# Patient Record
Sex: MALE | Race: WHITE | NOT HISPANIC OR LATINO | Employment: FULL TIME | URBAN - METROPOLITAN AREA
[De-identification: names, ages, dates, MRNs, and addresses within clinical notes are randomized per-mention and may not be internally consistent; named-entity substitution may affect disease eponyms.]

---

## 2018-05-04 ENCOUNTER — TRANSFERRED RECORDS (OUTPATIENT)
Dept: HEALTH INFORMATION MANAGEMENT | Facility: CLINIC | Age: 43
End: 2018-05-04

## 2018-05-14 ENCOUNTER — HOSPITAL ENCOUNTER (OUTPATIENT)
Dept: PHYSICAL THERAPY | Facility: CLINIC | Age: 43
Setting detail: THERAPIES SERIES
End: 2018-05-14
Attending: NURSE PRACTITIONER

## 2018-05-14 PROCEDURE — 97162 PT EVAL MOD COMPLEX 30 MIN: CPT | Mod: GP

## 2018-05-14 PROCEDURE — 40000718 ZZHC STATISTIC PT DEPARTMENT ORTHO VISIT

## 2018-05-14 PROCEDURE — 97110 THERAPEUTIC EXERCISES: CPT | Mod: GP
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
## 2018-05-23 ENCOUNTER — HOSPITAL ENCOUNTER (OUTPATIENT)
Dept: PHYSICAL THERAPY | Facility: CLINIC | Age: 43
Setting detail: THERAPIES SERIES
End: 2018-05-23
Attending: NURSE PRACTITIONER

## 2018-05-23 PROCEDURE — 97530 THERAPEUTIC ACTIVITIES: CPT | Mod: GP | Performed by: PHYSICAL THERAPIST

## 2018-05-23 PROCEDURE — 40000718 ZZHC STATISTIC PT DEPARTMENT ORTHO VISIT: Performed by: PHYSICAL THERAPIST

## 2018-05-29 ENCOUNTER — HOSPITAL ENCOUNTER (OUTPATIENT)
Dept: PHYSICAL THERAPY | Facility: CLINIC | Age: 43
Setting detail: THERAPIES SERIES
End: 2018-05-29
Attending: NURSE PRACTITIONER

## 2018-05-29 PROCEDURE — 97140 MANUAL THERAPY 1/> REGIONS: CPT | Mod: GP | Performed by: PHYSICAL THERAPIST

## 2018-05-29 PROCEDURE — 40000718 ZZHC STATISTIC PT DEPARTMENT ORTHO VISIT: Performed by: PHYSICAL THERAPIST

## 2018-06-05 ENCOUNTER — HOSPITAL ENCOUNTER (OUTPATIENT)
Dept: PHYSICAL THERAPY | Facility: CLINIC | Age: 43
Setting detail: THERAPIES SERIES
End: 2018-06-05
Attending: NURSE PRACTITIONER

## 2018-06-05 PROCEDURE — 97140 MANUAL THERAPY 1/> REGIONS: CPT | Mod: GP | Performed by: PHYSICAL THERAPIST

## 2018-06-05 PROCEDURE — 40000718 ZZHC STATISTIC PT DEPARTMENT ORTHO VISIT: Performed by: PHYSICAL THERAPIST

## 2018-06-08 ENCOUNTER — HOSPITAL ENCOUNTER (OUTPATIENT)
Dept: PHYSICAL THERAPY | Facility: CLINIC | Age: 43
Setting detail: THERAPIES SERIES
End: 2018-06-08
Attending: NURSE PRACTITIONER

## 2018-06-08 PROCEDURE — 40000718 ZZHC STATISTIC PT DEPARTMENT ORTHO VISIT: Performed by: PHYSICAL THERAPIST

## 2018-06-08 PROCEDURE — 97110 THERAPEUTIC EXERCISES: CPT | Mod: GP | Performed by: PHYSICAL THERAPIST

## 2018-06-08 PROCEDURE — 97140 MANUAL THERAPY 1/> REGIONS: CPT | Mod: GP | Performed by: PHYSICAL THERAPIST

## 2018-06-13 ENCOUNTER — HOSPITAL ENCOUNTER (OUTPATIENT)
Dept: PHYSICAL THERAPY | Facility: CLINIC | Age: 43
Setting detail: THERAPIES SERIES
End: 2018-06-13
Attending: NURSE PRACTITIONER

## 2018-06-13 PROCEDURE — 40000718 ZZHC STATISTIC PT DEPARTMENT ORTHO VISIT: Performed by: PHYSICAL THERAPIST

## 2018-06-13 PROCEDURE — 97140 MANUAL THERAPY 1/> REGIONS: CPT | Mod: GP | Performed by: PHYSICAL THERAPIST

## 2018-06-13 PROCEDURE — 97110 THERAPEUTIC EXERCISES: CPT | Mod: GP | Performed by: PHYSICAL THERAPIST

## 2018-06-20 ENCOUNTER — HOSPITAL ENCOUNTER (OUTPATIENT)
Dept: PHYSICAL THERAPY | Facility: CLINIC | Age: 43
Setting detail: THERAPIES SERIES
End: 2018-06-20
Attending: NURSE PRACTITIONER

## 2018-06-20 PROCEDURE — 97530 THERAPEUTIC ACTIVITIES: CPT | Mod: GP | Performed by: PHYSICAL THERAPIST

## 2018-06-20 PROCEDURE — 97140 MANUAL THERAPY 1/> REGIONS: CPT | Mod: GP | Performed by: PHYSICAL THERAPIST

## 2018-06-20 PROCEDURE — 40000718 ZZHC STATISTIC PT DEPARTMENT ORTHO VISIT: Performed by: PHYSICAL THERAPIST

## 2018-06-29 ENCOUNTER — HOSPITAL ENCOUNTER (OUTPATIENT)
Dept: PHYSICAL THERAPY | Facility: CLINIC | Age: 43
Setting detail: THERAPIES SERIES
End: 2018-06-29
Attending: NURSE PRACTITIONER

## 2018-06-29 PROCEDURE — 97140 MANUAL THERAPY 1/> REGIONS: CPT | Mod: GP | Performed by: PHYSICAL THERAPIST

## 2018-06-29 PROCEDURE — 40000718 ZZHC STATISTIC PT DEPARTMENT ORTHO VISIT: Performed by: PHYSICAL THERAPIST

## 2018-07-02 ENCOUNTER — HOSPITAL ENCOUNTER (OUTPATIENT)
Dept: PHYSICAL THERAPY | Facility: CLINIC | Age: 43
Setting detail: THERAPIES SERIES
End: 2018-07-02
Attending: NURSE PRACTITIONER

## 2018-07-02 PROCEDURE — 97140 MANUAL THERAPY 1/> REGIONS: CPT | Mod: GP | Performed by: PHYSICAL THERAPIST

## 2018-07-02 PROCEDURE — 97035 APP MDLTY 1+ULTRASOUND EA 15: CPT | Mod: GP | Performed by: PHYSICAL THERAPIST

## 2018-07-02 PROCEDURE — 40000718 ZZHC STATISTIC PT DEPARTMENT ORTHO VISIT: Performed by: PHYSICAL THERAPIST

## 2018-07-09 ENCOUNTER — HOSPITAL ENCOUNTER (OUTPATIENT)
Dept: PHYSICAL THERAPY | Facility: CLINIC | Age: 43
Setting detail: THERAPIES SERIES
End: 2018-07-09
Attending: NURSE PRACTITIONER

## 2018-07-09 PROCEDURE — 40000718 ZZHC STATISTIC PT DEPARTMENT ORTHO VISIT: Performed by: PHYSICAL THERAPIST

## 2018-07-09 PROCEDURE — 97035 APP MDLTY 1+ULTRASOUND EA 15: CPT | Mod: GP | Performed by: PHYSICAL THERAPIST

## 2018-07-09 PROCEDURE — 97140 MANUAL THERAPY 1/> REGIONS: CPT | Mod: GP | Performed by: PHYSICAL THERAPIST

## 2018-07-16 ENCOUNTER — HOSPITAL ENCOUNTER (OUTPATIENT)
Dept: PHYSICAL THERAPY | Facility: CLINIC | Age: 43
Setting detail: THERAPIES SERIES
End: 2018-07-16
Attending: NURSE PRACTITIONER

## 2018-07-16 PROCEDURE — 97035 APP MDLTY 1+ULTRASOUND EA 15: CPT | Mod: GP | Performed by: PHYSICAL THERAPIST

## 2018-07-16 PROCEDURE — 97140 MANUAL THERAPY 1/> REGIONS: CPT | Mod: GP | Performed by: PHYSICAL THERAPIST

## 2018-07-16 PROCEDURE — 40000718 ZZHC STATISTIC PT DEPARTMENT ORTHO VISIT: Performed by: PHYSICAL THERAPIST

## 2018-07-24 ENCOUNTER — HOSPITAL ENCOUNTER (OUTPATIENT)
Dept: PHYSICAL THERAPY | Facility: CLINIC | Age: 43
Setting detail: THERAPIES SERIES
End: 2018-07-24
Attending: NURSE PRACTITIONER

## 2018-07-24 PROCEDURE — 97140 MANUAL THERAPY 1/> REGIONS: CPT | Mod: GP | Performed by: PHYSICAL THERAPIST

## 2018-07-24 PROCEDURE — 40000718 ZZHC STATISTIC PT DEPARTMENT ORTHO VISIT: Performed by: PHYSICAL THERAPIST

## 2018-07-24 PROCEDURE — 97110 THERAPEUTIC EXERCISES: CPT | Mod: GP | Performed by: PHYSICAL THERAPIST

## 2018-07-24 NOTE — PROGRESS NOTES
Outpatient Physical Therapy Progress Note     Patient: Jaquan Schwab  : 1975    Beginning/End Dates of Reporting Period:  2018 to 2018    Referring Provider: Bharati Lay CNP    Therapy Diagnosis: Low back pain radiating into L LE     Client Self Report: Pt states having good days and bad days.  Pt reports he is noticing on the bad days still having about 8-9/10 pain.      Objective Measurements:  Objective Measure: Palpation  Details: Improved L QL mobility.  Continues to have L gluteal tenderness and tightness.  Objective Measure: Observation  Details: Standing and sitting more upright.  However continues to have a slight forward lean and unable to stand at neutral yet.    Outcome Measures (most recent score):  Nikolay STarT Sub-Score (Q5-9): 2  Nikolay STarT Total Score (all 9): 5  Oswestry Score (%): 60 %    Goals:  Goal Identifier Pain   Goal Description Pt will report <2/10 pain after working all day in order to demonstrate decresaed pain and limitations with mobility.   Target Date 18   Date Met   (End of the day 8-9/10, 1 hour to calm down)   Progress:     Goal Identifier ROM   Goal Description Pt will demonstrate full lumbar and L LE ROM in order to decrease stiffness and neural tension in LE's.    Target Date 18   Date Met   (Not having full extension)   Progress:     Goal Identifier Strength   Goal Description Pt will demonstrate 5/5 B hip strength in order to demonstrate improved gait and stability.    Target Date 18   Date Met   (Still feeling weakness at the end of the day, gives out)   Progress:     Goal Identifier Ambulation   Goal Description Pt will be able to ambulate for 5minutes continuously with appropriate posture and speed to demonstrate decreased pain and limitation.   Target Date 18   Date Met   (Still leaning forward, slower pace, 2-3 minutes only)   Progress:     Progress Toward Goals:   Progress this reporting period: Pt has been working on HEP and  symptom management.  Pt continues to demonstrate poor upright and sitting posture due to pain and muscle/joint restrictions.  Pt is walking slower, sitting and standing for short periods of time, and having difficulties lifting.  He has noticed improvement with sleeping and more good days of less pain.  Pt states HEP does not bother him, however towards the end of a work day he is more bent forward and in more pain.  It is taking less time for him to calm symptoms down and improve posture.  Pt will benefit from continued PT services to improve overall strength, mobility, posture, and positioning in order to improve functional activities.    Plan:  Continue therapy per current plan of care.    Discharge:  No    Thank you for your referral.    Lucia Best, PT, DPT  [facility]  [phone]

## 2018-07-30 ENCOUNTER — HOSPITAL ENCOUNTER (OUTPATIENT)
Dept: PHYSICAL THERAPY | Facility: CLINIC | Age: 43
Setting detail: THERAPIES SERIES
End: 2018-07-30
Attending: NURSE PRACTITIONER

## 2018-07-30 PROCEDURE — 97140 MANUAL THERAPY 1/> REGIONS: CPT | Mod: GP | Performed by: PHYSICAL THERAPIST

## 2018-07-30 PROCEDURE — 97012 MECHANICAL TRACTION THERAPY: CPT | Mod: GP | Performed by: PHYSICAL THERAPIST

## 2018-07-30 PROCEDURE — 97530 THERAPEUTIC ACTIVITIES: CPT | Mod: GP | Performed by: PHYSICAL THERAPIST

## 2018-07-30 PROCEDURE — 40000718 ZZHC STATISTIC PT DEPARTMENT ORTHO VISIT: Performed by: PHYSICAL THERAPIST

## 2018-08-07 ENCOUNTER — HOSPITAL ENCOUNTER (OUTPATIENT)
Dept: PHYSICAL THERAPY | Facility: CLINIC | Age: 43
Setting detail: THERAPIES SERIES
End: 2018-08-07
Attending: NURSE PRACTITIONER

## 2018-08-07 PROCEDURE — 97110 THERAPEUTIC EXERCISES: CPT | Mod: GP | Performed by: PHYSICAL THERAPIST

## 2018-08-07 PROCEDURE — 40000718 ZZHC STATISTIC PT DEPARTMENT ORTHO VISIT: Performed by: PHYSICAL THERAPIST

## 2018-08-07 PROCEDURE — 97012 MECHANICAL TRACTION THERAPY: CPT | Mod: GP | Performed by: PHYSICAL THERAPIST

## 2018-08-13 ENCOUNTER — HOSPITAL ENCOUNTER (OUTPATIENT)
Dept: PHYSICAL THERAPY | Facility: CLINIC | Age: 43
Setting detail: THERAPIES SERIES
End: 2018-08-13
Attending: NURSE PRACTITIONER

## 2018-08-13 PROCEDURE — 97110 THERAPEUTIC EXERCISES: CPT | Mod: GP | Performed by: PHYSICAL THERAPIST

## 2018-08-13 PROCEDURE — 40000718 ZZHC STATISTIC PT DEPARTMENT ORTHO VISIT: Performed by: PHYSICAL THERAPIST

## 2018-08-13 PROCEDURE — 97012 MECHANICAL TRACTION THERAPY: CPT | Mod: GP | Performed by: PHYSICAL THERAPIST

## 2018-08-21 ENCOUNTER — HOSPITAL ENCOUNTER (OUTPATIENT)
Dept: PHYSICAL THERAPY | Facility: CLINIC | Age: 43
Setting detail: THERAPIES SERIES
End: 2018-08-21
Attending: NURSE PRACTITIONER

## 2018-08-21 PROCEDURE — 97110 THERAPEUTIC EXERCISES: CPT | Mod: GP

## 2018-08-21 PROCEDURE — 97140 MANUAL THERAPY 1/> REGIONS: CPT | Mod: GP

## 2018-08-21 PROCEDURE — 40000718 ZZHC STATISTIC PT DEPARTMENT ORTHO VISIT

## 2018-08-28 ENCOUNTER — HOSPITAL ENCOUNTER (OUTPATIENT)
Dept: PHYSICAL THERAPY | Facility: CLINIC | Age: 43
Setting detail: THERAPIES SERIES
End: 2018-08-28
Attending: NURSE PRACTITIONER

## 2018-08-28 PROCEDURE — 97012 MECHANICAL TRACTION THERAPY: CPT | Mod: GP | Performed by: PHYSICAL THERAPIST

## 2018-08-28 PROCEDURE — 40000718 ZZHC STATISTIC PT DEPARTMENT ORTHO VISIT: Performed by: PHYSICAL THERAPIST

## 2018-08-28 PROCEDURE — 97530 THERAPEUTIC ACTIVITIES: CPT | Mod: GP | Performed by: PHYSICAL THERAPIST

## 2018-09-04 ENCOUNTER — HOSPITAL ENCOUNTER (OUTPATIENT)
Dept: PHYSICAL THERAPY | Facility: CLINIC | Age: 43
Setting detail: THERAPIES SERIES
End: 2018-09-04
Attending: NURSE PRACTITIONER

## 2018-09-04 PROCEDURE — 97012 MECHANICAL TRACTION THERAPY: CPT | Mod: GP | Performed by: PHYSICAL THERAPIST

## 2018-09-04 PROCEDURE — 40000718 ZZHC STATISTIC PT DEPARTMENT ORTHO VISIT: Performed by: PHYSICAL THERAPIST

## 2018-09-04 PROCEDURE — 97110 THERAPEUTIC EXERCISES: CPT | Mod: GP | Performed by: PHYSICAL THERAPIST

## 2018-09-11 ENCOUNTER — HOSPITAL ENCOUNTER (OUTPATIENT)
Dept: PHYSICAL THERAPY | Facility: CLINIC | Age: 43
Setting detail: THERAPIES SERIES
End: 2018-09-11
Attending: NURSE PRACTITIONER

## 2018-09-11 PROCEDURE — 97140 MANUAL THERAPY 1/> REGIONS: CPT | Mod: GP | Performed by: PHYSICAL THERAPIST

## 2018-09-11 PROCEDURE — 40000718 ZZHC STATISTIC PT DEPARTMENT ORTHO VISIT: Performed by: PHYSICAL THERAPIST

## 2018-09-21 ENCOUNTER — HOSPITAL ENCOUNTER (OUTPATIENT)
Dept: PHYSICAL THERAPY | Facility: CLINIC | Age: 43
Setting detail: THERAPIES SERIES
End: 2018-09-21
Attending: NURSE PRACTITIONER

## 2018-09-21 PROCEDURE — 97530 THERAPEUTIC ACTIVITIES: CPT | Mod: GP | Performed by: PHYSICAL THERAPIST

## 2018-09-21 PROCEDURE — 40000718 ZZHC STATISTIC PT DEPARTMENT ORTHO VISIT: Performed by: PHYSICAL THERAPIST

## 2018-09-21 PROCEDURE — 97012 MECHANICAL TRACTION THERAPY: CPT | Mod: GP | Performed by: PHYSICAL THERAPIST

## 2018-09-21 NOTE — PROGRESS NOTES
Outpatient Physical Therapy Progress Note     Patient: Jaquan Schwab  : 1975    Beginning/End Dates of Reporting Period:  2018 to 2018    Referring Provider: Bharati Lay CNP    Therapy Diagnosis: Low back pain radiating into L LE     Client Self Report: On  to Wednesday he had to fly out of state and was at a convention for 3 days.  He has a flare up of symptoms but not as bad as last convention he was at.  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  Saturday having a flare up of symptoms and now about 6-7/10 after work and about 1.5-2 hours to calm down.    Objective Measurements:  Objective Measure: Nikolay  Details:  Medium  Objective Measure: CATHERINE  Details: 60%  Objective Measure: Palpation  Details: L QL tightness.  Decreased L4-5 extension and sacral flexion.  Objective Measure: Observation  Details: Slight forward bent position since last visit.  More symptoms and flared up.     Goals:  Goal Identifier Pain   Goal Description Pt will report <2/10 pain after working all day in order to demonstrate decresaed pain and limitations with mobility.   Target Date 18   Date Met   (End of the day 6-7/10, 1.5-2 hour to calm down)   Progress:     Goal Identifier ROM   Goal Description Pt will demonstrate full lumbar and L LE ROM in order to decrease stiffness and neural tension in LE's.    Target Date 18   Date Met   (Not having full extension, still bent forward)   Progress:     Goal Identifier Strength   Goal Description Pt will demonstrate 5/5 B hip strength in order to demonstrate improved gait and stability.    Target Date 18   Date Met   (Coming back, not giving out)   Progress:     Goal Identifier Ambulation   Goal Description Pt will be able to ambulate for 5minutes continuously with appropriate posture and speed to demonstrate decreased pain and limitation.   Target Date 18   Date Met  18 (Less  then 5 minutes this past week)   Progress:     Progress Toward Goals:   Progress this reporting period: Pt has been progressing really well the past few weeks, however over this past weekend he had to travel out of state on a plane and did a lot of walking for a convention for work.  When pt is required to do a lot of standing and walking he gets increased symptoms.  The last time he had this flare up was after the [place] convention where he was standing and walking for 3 days.  Pt does not have the strength, endurance, stability, and mobility in the lumbosacral spine yet to tolerate this higher level of activity.  Pt will benefit from skilled PT services to address impairments and work towards therapy goals.    Plan:  Continue therapy per current plan of care.    Discharge:  No    Thank you for your referral.    Lucia Long, PT, DPT  [facility]  [phone]

## 2018-09-25 ENCOUNTER — HOSPITAL ENCOUNTER (OUTPATIENT)
Dept: PHYSICAL THERAPY | Facility: CLINIC | Age: 43
Setting detail: THERAPIES SERIES
End: 2018-09-25
Attending: NURSE PRACTITIONER

## 2018-09-25 PROCEDURE — 97012 MECHANICAL TRACTION THERAPY: CPT | Mod: GP | Performed by: PHYSICAL THERAPIST

## 2018-09-25 PROCEDURE — 97140 MANUAL THERAPY 1/> REGIONS: CPT | Mod: GP | Performed by: PHYSICAL THERAPIST

## 2018-09-25 PROCEDURE — 40000718 ZZHC STATISTIC PT DEPARTMENT ORTHO VISIT: Performed by: PHYSICAL THERAPIST

## 2018-10-02 ENCOUNTER — HOSPITAL ENCOUNTER (OUTPATIENT)
Dept: PHYSICAL THERAPY | Facility: CLINIC | Age: 43
Setting detail: THERAPIES SERIES
End: 2018-10-02
Attending: NURSE PRACTITIONER

## 2018-10-02 PROCEDURE — 97140 MANUAL THERAPY 1/> REGIONS: CPT | Mod: GP | Performed by: PHYSICAL THERAPIST

## 2018-10-02 PROCEDURE — 97012 MECHANICAL TRACTION THERAPY: CPT | Mod: GP | Performed by: PHYSICAL THERAPIST

## 2018-10-02 PROCEDURE — 40000718 ZZHC STATISTIC PT DEPARTMENT ORTHO VISIT: Performed by: PHYSICAL THERAPIST

## 2018-10-08 ENCOUNTER — HOSPITAL ENCOUNTER (OUTPATIENT)
Dept: PHYSICAL THERAPY | Facility: CLINIC | Age: 43
Setting detail: THERAPIES SERIES
End: 2018-10-08
Attending: NURSE PRACTITIONER

## 2018-10-08 PROCEDURE — 40000718 ZZHC STATISTIC PT DEPARTMENT ORTHO VISIT: Performed by: PHYSICAL THERAPIST

## 2018-10-08 PROCEDURE — 97140 MANUAL THERAPY 1/> REGIONS: CPT | Mod: GP | Performed by: PHYSICAL THERAPIST

## 2018-10-12 ENCOUNTER — HOSPITAL ENCOUNTER (OUTPATIENT)
Dept: PHYSICAL THERAPY | Facility: CLINIC | Age: 43
Setting detail: THERAPIES SERIES
End: 2018-10-12
Attending: NURSE PRACTITIONER

## 2018-10-12 PROCEDURE — 97530 THERAPEUTIC ACTIVITIES: CPT | Mod: GP | Performed by: PHYSICAL THERAPIST

## 2018-10-12 PROCEDURE — 97012 MECHANICAL TRACTION THERAPY: CPT | Mod: GP | Performed by: PHYSICAL THERAPIST

## 2018-10-12 PROCEDURE — 40000718 ZZHC STATISTIC PT DEPARTMENT ORTHO VISIT: Performed by: PHYSICAL THERAPIST

## 2018-10-16 ENCOUNTER — HOSPITAL ENCOUNTER (OUTPATIENT)
Dept: PHYSICAL THERAPY | Facility: CLINIC | Age: 43
Setting detail: THERAPIES SERIES
End: 2018-10-16
Attending: NURSE PRACTITIONER

## 2018-10-16 PROCEDURE — 97140 MANUAL THERAPY 1/> REGIONS: CPT | Mod: GP | Performed by: PHYSICAL THERAPIST

## 2018-10-16 PROCEDURE — 40000718 ZZHC STATISTIC PT DEPARTMENT ORTHO VISIT: Performed by: PHYSICAL THERAPIST

## 2018-10-23 ENCOUNTER — HOSPITAL ENCOUNTER (OUTPATIENT)
Dept: PHYSICAL THERAPY | Facility: CLINIC | Age: 43
Setting detail: THERAPIES SERIES
End: 2018-10-23
Attending: NURSE PRACTITIONER

## 2018-10-23 PROCEDURE — 40000718 ZZHC STATISTIC PT DEPARTMENT ORTHO VISIT: Performed by: PHYSICAL THERAPIST

## 2018-10-23 PROCEDURE — 97140 MANUAL THERAPY 1/> REGIONS: CPT | Mod: GP | Performed by: PHYSICAL THERAPIST

## 2018-10-30 ENCOUNTER — HOSPITAL ENCOUNTER (OUTPATIENT)
Dept: PHYSICAL THERAPY | Facility: CLINIC | Age: 43
Setting detail: THERAPIES SERIES
End: 2018-10-30
Attending: NURSE PRACTITIONER

## 2018-10-30 PROCEDURE — 97140 MANUAL THERAPY 1/> REGIONS: CPT | Mod: GP | Performed by: PHYSICAL THERAPIST

## 2018-10-30 PROCEDURE — 40000718 ZZHC STATISTIC PT DEPARTMENT ORTHO VISIT: Performed by: PHYSICAL THERAPIST

## 2018-11-06 ENCOUNTER — HOSPITAL ENCOUNTER (OUTPATIENT)
Dept: PHYSICAL THERAPY | Facility: CLINIC | Age: 43
Setting detail: THERAPIES SERIES
End: 2018-11-06
Attending: NURSE PRACTITIONER

## 2018-11-06 PROCEDURE — 97140 MANUAL THERAPY 1/> REGIONS: CPT | Mod: GP | Performed by: PHYSICAL THERAPIST

## 2018-11-06 PROCEDURE — 40000718 ZZHC STATISTIC PT DEPARTMENT ORTHO VISIT: Performed by: PHYSICAL THERAPIST

## 2018-11-06 PROCEDURE — 97035 APP MDLTY 1+ULTRASOUND EA 15: CPT | Mod: GP | Performed by: PHYSICAL THERAPIST

## 2018-11-13 ENCOUNTER — HOSPITAL ENCOUNTER (OUTPATIENT)
Dept: PHYSICAL THERAPY | Facility: CLINIC | Age: 43
Setting detail: THERAPIES SERIES
End: 2018-11-13
Attending: NURSE PRACTITIONER

## 2018-11-13 PROCEDURE — 40000718 ZZHC STATISTIC PT DEPARTMENT ORTHO VISIT: Performed by: PHYSICAL THERAPIST

## 2018-11-13 PROCEDURE — 97140 MANUAL THERAPY 1/> REGIONS: CPT | Mod: GP | Performed by: PHYSICAL THERAPIST

## 2018-11-13 PROCEDURE — 97035 APP MDLTY 1+ULTRASOUND EA 15: CPT | Mod: GP | Performed by: PHYSICAL THERAPIST

## 2018-11-19 ENCOUNTER — HOSPITAL ENCOUNTER (OUTPATIENT)
Dept: PHYSICAL THERAPY | Facility: CLINIC | Age: 43
Setting detail: THERAPIES SERIES
End: 2018-11-19
Attending: NURSE PRACTITIONER

## 2018-11-19 PROCEDURE — 97140 MANUAL THERAPY 1/> REGIONS: CPT | Mod: GP | Performed by: PHYSICAL THERAPIST

## 2018-11-19 PROCEDURE — 97035 APP MDLTY 1+ULTRASOUND EA 15: CPT | Mod: GP | Performed by: PHYSICAL THERAPIST

## 2018-11-19 PROCEDURE — 40000718 ZZHC STATISTIC PT DEPARTMENT ORTHO VISIT: Performed by: PHYSICAL THERAPIST

## 2018-11-26 ENCOUNTER — HOSPITAL ENCOUNTER (OUTPATIENT)
Dept: PHYSICAL THERAPY | Facility: CLINIC | Age: 43
Setting detail: THERAPIES SERIES
End: 2018-11-26
Attending: NURSE PRACTITIONER

## 2018-11-26 PROCEDURE — 40000718 ZZHC STATISTIC PT DEPARTMENT ORTHO VISIT: Performed by: PHYSICAL THERAPIST

## 2018-11-26 PROCEDURE — 97140 MANUAL THERAPY 1/> REGIONS: CPT | Mod: GP | Performed by: PHYSICAL THERAPIST

## 2018-12-04 ENCOUNTER — HOSPITAL ENCOUNTER (OUTPATIENT)
Dept: PHYSICAL THERAPY | Facility: CLINIC | Age: 43
Setting detail: THERAPIES SERIES
End: 2018-12-04
Attending: NURSE PRACTITIONER

## 2018-12-04 PROCEDURE — 97035 APP MDLTY 1+ULTRASOUND EA 15: CPT | Mod: GP | Performed by: PHYSICAL THERAPIST

## 2018-12-04 PROCEDURE — 97140 MANUAL THERAPY 1/> REGIONS: CPT | Mod: GP | Performed by: PHYSICAL THERAPIST

## 2018-12-04 PROCEDURE — 40000718 ZZHC STATISTIC PT DEPARTMENT ORTHO VISIT: Performed by: PHYSICAL THERAPIST

## 2018-12-11 ENCOUNTER — HOSPITAL ENCOUNTER (OUTPATIENT)
Dept: PHYSICAL THERAPY | Facility: CLINIC | Age: 43
Setting detail: THERAPIES SERIES
End: 2018-12-11
Attending: NURSE PRACTITIONER

## 2018-12-11 PROCEDURE — 40000718 ZZHC STATISTIC PT DEPARTMENT ORTHO VISIT: Performed by: PHYSICAL THERAPIST

## 2018-12-11 PROCEDURE — 97140 MANUAL THERAPY 1/> REGIONS: CPT | Mod: GP | Performed by: PHYSICAL THERAPIST

## 2018-12-11 PROCEDURE — 97116 GAIT TRAINING THERAPY: CPT | Mod: GP | Performed by: PHYSICAL THERAPIST

## 2018-12-18 ENCOUNTER — HOSPITAL ENCOUNTER (OUTPATIENT)
Dept: PHYSICAL THERAPY | Facility: CLINIC | Age: 43
Setting detail: THERAPIES SERIES
End: 2018-12-18
Attending: NURSE PRACTITIONER

## 2018-12-18 PROCEDURE — 97140 MANUAL THERAPY 1/> REGIONS: CPT | Mod: GP | Performed by: PHYSICAL THERAPIST

## 2018-12-26 ENCOUNTER — HOSPITAL ENCOUNTER (OUTPATIENT)
Dept: PHYSICAL THERAPY | Facility: CLINIC | Age: 43
Setting detail: THERAPIES SERIES
End: 2018-12-26
Attending: NURSE PRACTITIONER

## 2018-12-26 PROCEDURE — 97140 MANUAL THERAPY 1/> REGIONS: CPT | Mod: GP | Performed by: PHYSICAL THERAPIST

## 2019-01-02 ENCOUNTER — HOSPITAL ENCOUNTER (OUTPATIENT)
Dept: PHYSICAL THERAPY | Facility: CLINIC | Age: 44
Setting detail: THERAPIES SERIES
End: 2019-01-02
Attending: NURSE PRACTITIONER
Payer: COMMERCIAL

## 2019-01-02 PROCEDURE — 97140 MANUAL THERAPY 1/> REGIONS: CPT | Mod: GP | Performed by: PHYSICAL THERAPIST

## 2019-01-02 NOTE — PROGRESS NOTES
Outpatient Physical Therapy Progress Note     Patient: Jaquan Schwab  : 1975    Beginning/End Dates of Reporting Period:  2018 to 2019    Referring Provider: Bharati Lay CNP    Therapy Diagnosis: Low back pain radiating into L LE     Client Self Report: Getting about 5000 steps in a day with increasing symptoms.  Can only walk about 3 minutes before he needs to brace himself and becomes slumped over.  Pt states that he has good and bad days.  Noticing towards the end of the week he is having increasing pain.  Still having intermittent L LE symptoms with walking and sitting.   Needs to sit on ice and in recliner to calm symptoms down.    Objective Measurements:  Objective Measure: Nikolay  Details: 3/7 Medium  Objective Measure: CATHERINE  Details: 56%  Objective Measure: Palpation  Details: Increased L gluteal tissue mobility, improved lumbar paraspinal mobility all on L side.  Objective Measure: Observation  Details: Walking upright for short distances.  Objective Measure: MMT  Details: 4/5 L hip abduction and flexion    Goals:  Goal Identifier Pain   Goal Description Pt will report <2/10 pain after working all day in order to demonstrate decresaed pain and limitations with mobility.   Target Date 19   Date Met  (End of the day 5-6/10, 1-1.5 hours to calm down)   Progress:     Goal Identifier ROM   Goal Description Pt will demonstrate full lumbar and L LE ROM in order to decrease stiffness and neural tension in LE's.    Target Date 19   Date Met  (Extension to neutral of lumbar spine)   Progress:     Goal Identifier Strength   Goal Description Pt will demonstrate 5/5 B hip strength in order to demonstrate improved gait and stability.    Target Date 19   Date Met  (4/5 MMT of the L LE hip flexion and abduction)   Progress:     Goal Identifier Ambulation   Goal Description Pt will be able to ambulate for 5minutes continuously with appropriate posture and speed to demonstrate decreased pain  and limitation.   Target Date 09/07/18   Date Met  09/04/18(Less then 5 minutes this past week)   Progress:     Progress Toward Goals:   Progress this reporting period: Pt has been progressing slowly towards therapy goals.  He has had 3 flare ups, one requiring repeated injections and follow up with MD.  Pt is now being referred to neurosurgeon due to symptoms and decreased effectiveness of the injections.  Pt continues to respond well to therapy and able to slowly progress his HEP.  However he continues to have end of the week increased symptoms and intermittent leg symptoms on L side.  Pt will benefit from continued PT services to address tissue and joint mobility and stability, strength, and symptom management.    Plan:  Continue therapy per current plan of care.    Discharge:  No    Thank you for your referral.    Lucia Logn, PT, DPT  [facility]  [phone]

## 2019-01-08 ENCOUNTER — HOSPITAL ENCOUNTER (OUTPATIENT)
Dept: PHYSICAL THERAPY | Facility: CLINIC | Age: 44
Setting detail: THERAPIES SERIES
End: 2019-01-08
Attending: NURSE PRACTITIONER
Payer: COMMERCIAL

## 2019-01-08 PROCEDURE — 97140 MANUAL THERAPY 1/> REGIONS: CPT | Mod: GP | Performed by: PHYSICAL THERAPIST

## 2019-01-15 ENCOUNTER — HOSPITAL ENCOUNTER (OUTPATIENT)
Dept: PHYSICAL THERAPY | Facility: CLINIC | Age: 44
Setting detail: THERAPIES SERIES
End: 2019-01-15
Attending: NURSE PRACTITIONER
Payer: COMMERCIAL

## 2019-01-15 PROCEDURE — 97140 MANUAL THERAPY 1/> REGIONS: CPT | Mod: GP | Performed by: PHYSICAL THERAPIST

## 2019-01-15 PROCEDURE — 97530 THERAPEUTIC ACTIVITIES: CPT | Mod: GP | Performed by: PHYSICAL THERAPIST

## 2019-01-22 ENCOUNTER — HOSPITAL ENCOUNTER (OUTPATIENT)
Dept: PHYSICAL THERAPY | Facility: CLINIC | Age: 44
Setting detail: THERAPIES SERIES
End: 2019-01-22
Attending: NURSE PRACTITIONER
Payer: COMMERCIAL

## 2019-01-22 PROCEDURE — 97140 MANUAL THERAPY 1/> REGIONS: CPT | Mod: GP | Performed by: PHYSICAL THERAPIST

## 2019-01-22 NOTE — PROGRESS NOTES
Outpatient Physical Therapy Discharge Note     Patient: Jaquan Schwab  : 1975    Beginning/End Dates of Reporting Period:  2019 to 2019    Referring Provider: Bharati Lay CNP    Therapy Diagnosis: Low back pain radiating into L LE     Client Self Report: Pt had to stop taking ibuprofen due to having surgery next week.  Reports the pain has been getting worse since getting off that medication.  Pt states pain today is about 7-8/10.  Reports that he is noticing more L leg pain and very constant.    Objective Measurements:  Objective Measure: Nikolay  Details: 3/ Medium  Objective Measure: CATHERINE  Details: 64%  Objective Measure: Palpation  Details: Increased L side lumbar paraspinal tone and tenderness.  L gluteal tightness and L LE pain.  Objective Measure: Observation  Details: About 100 yards of walking before he starts to slump over in a forward bent position.  Objective Measure: MMT  Details: 5/5 R LE, 4/5 L hip abduction, flexion, knee extension, knee flexion, and DF    Outcome Measures (most recent score):  Nikolay STarT Sub-Score (Q5-9): 3  Nikolay STarT Total Score (all 9): 7  Oswestry Score (%): 64 %    Goals:  Goal Identifier Pain   Goal Description Pt will report <2/10 pain after working all day in order to demonstrate decresaed pain and limitations with mobility.   Target Date 19   Date Met  (End of the day 9/10, 1-1.5 hours to calm down)   Progress:     Goal Identifier ROM   Goal Description Pt will demonstrate full lumbar and L LE ROM in order to decrease stiffness and neural tension in LE's.    Target Date 19   Date Met  (Extension to neutral of lumbar spine)   Progress:     Goal Identifier Strength   Goal Description Pt will demonstrate 5/5 B hip strength in order to demonstrate improved gait and stability.    Target Date 19   Date Met  (4/5 MMT of the L LE hip flexion and abduction)   Progress:     Goal Identifier Ambulation   Goal Description Pt will be able to ambulate  for 5minutes continuously with appropriate posture and speed to demonstrate decreased pain and limitation.   Target Date 09/07/18   Date Met  09/04/18(Less then 5 minutes this past week)   Progress:     Progress Toward Goals:   Pt had on and off flare ups of the low back since the beginning of last year.  He would progress well with PT and able to progress his HEP with stability and strength exercises.  However he continued to have issues.  Medications, taping, and PT were the only things managing pt's pain and leg symptoms.  Pt finally was able to see a surgeon for his issues and only course of action is to have surgery.  Pt will be undergoing surgery on 1/28/19.    Plan:  Discharge from therapy.    Discharge:    Reason for Discharge: Change in medical status.    Equipment Issued: None    Discharge Plan: Patient to continue home program after surgery per PT in hospital for healing.    Thank you for your referral.    Lucia Long, PT, DPT  [facility]  [phone]

## 2019-01-23 ENCOUNTER — HOSPITAL ENCOUNTER (OUTPATIENT)
Dept: CARDIOLOGY | Facility: CLINIC | Age: 44
Discharge: HOME | End: 2019-01-23
Admitting: NURSE PRACTITIONER
Payer: COMMERCIAL

## 2019-01-23 DIAGNOSIS — R94.31 ABNORMAL EKG: ICD-10-CM

## 2019-01-23 PROCEDURE — 40000264 ECHOCARDIOGRAM COMPLETE

## 2019-01-23 PROCEDURE — 25500064 ZZH RX 255 OP 636

## 2019-01-23 PROCEDURE — 93306 TTE W/DOPPLER COMPLETE: CPT | Mod: 26 | Performed by: INTERNAL MEDICINE

## 2019-01-23 RX ADMIN — HUMAN ALBUMIN MICROSPHERES AND PERFLUTREN 5 ML: 10; .22 INJECTION, SOLUTION INTRAVENOUS at 13:56

## 2019-05-08 ENCOUNTER — MEDICAL CORRESPONDENCE (OUTPATIENT)
Dept: HEALTH INFORMATION MANAGEMENT | Facility: CLINIC | Age: 44
End: 2019-05-08

## 2019-05-10 ENCOUNTER — HOSPITAL ENCOUNTER (OUTPATIENT)
Dept: PHYSICAL THERAPY | Facility: CLINIC | Age: 44
Setting detail: THERAPIES SERIES
End: 2019-05-10
Payer: COMMERCIAL

## 2019-05-10 PROCEDURE — 97161 PT EVAL LOW COMPLEX 20 MIN: CPT | Mod: GP | Performed by: PHYSICAL THERAPIST

## 2019-05-10 PROCEDURE — 97140 MANUAL THERAPY 1/> REGIONS: CPT | Mod: GP | Performed by: PHYSICAL THERAPIST
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
## 2019-05-15 ENCOUNTER — HOSPITAL ENCOUNTER (OUTPATIENT)
Dept: PHYSICAL THERAPY | Facility: CLINIC | Age: 44
Setting detail: THERAPIES SERIES
End: 2019-05-15
Attending: NEUROLOGICAL SURGERY
Payer: COMMERCIAL

## 2019-05-15 PROCEDURE — 97140 MANUAL THERAPY 1/> REGIONS: CPT | Mod: GP | Performed by: PHYSICAL THERAPIST

## 2019-05-21 ENCOUNTER — HOSPITAL ENCOUNTER (OUTPATIENT)
Dept: PHYSICAL THERAPY | Facility: CLINIC | Age: 44
Setting detail: THERAPIES SERIES
End: 2019-05-21
Attending: NEUROLOGICAL SURGERY
Payer: COMMERCIAL

## 2019-05-21 PROCEDURE — 97110 THERAPEUTIC EXERCISES: CPT | Mod: GP

## 2019-05-21 PROCEDURE — 97140 MANUAL THERAPY 1/> REGIONS: CPT | Mod: GP

## 2019-05-31 ENCOUNTER — HOSPITAL ENCOUNTER (OUTPATIENT)
Dept: PHYSICAL THERAPY | Facility: CLINIC | Age: 44
Setting detail: THERAPIES SERIES
End: 2019-05-31
Attending: NEUROLOGICAL SURGERY
Payer: COMMERCIAL

## 2019-05-31 PROCEDURE — 97140 MANUAL THERAPY 1/> REGIONS: CPT | Mod: GP | Performed by: PHYSICAL THERAPIST

## 2019-06-04 ENCOUNTER — HOSPITAL ENCOUNTER (OUTPATIENT)
Dept: PHYSICAL THERAPY | Facility: CLINIC | Age: 44
Setting detail: THERAPIES SERIES
End: 2019-06-04
Attending: NEUROLOGICAL SURGERY
Payer: COMMERCIAL

## 2019-06-04 PROCEDURE — 97140 MANUAL THERAPY 1/> REGIONS: CPT | Mod: GP | Performed by: PHYSICAL THERAPIST

## 2019-06-04 PROCEDURE — 97010 HOT OR COLD PACKS THERAPY: CPT | Mod: GP | Performed by: PHYSICAL THERAPIST

## 2019-06-11 ENCOUNTER — HOSPITAL ENCOUNTER (OUTPATIENT)
Dept: PHYSICAL THERAPY | Facility: CLINIC | Age: 44
Setting detail: THERAPIES SERIES
End: 2019-06-11
Attending: NEUROLOGICAL SURGERY
Payer: COMMERCIAL

## 2019-06-11 PROCEDURE — 97530 THERAPEUTIC ACTIVITIES: CPT | Mod: GP | Performed by: PHYSICAL THERAPIST

## 2019-06-25 ENCOUNTER — HOSPITAL ENCOUNTER (OUTPATIENT)
Dept: PHYSICAL THERAPY | Facility: CLINIC | Age: 44
Setting detail: THERAPIES SERIES
End: 2019-06-25
Attending: NEUROLOGICAL SURGERY
Payer: COMMERCIAL

## 2019-06-25 PROCEDURE — 97110 THERAPEUTIC EXERCISES: CPT | Mod: GP | Performed by: PHYSICAL THERAPIST

## 2019-06-25 NOTE — PROGRESS NOTES
Outpatient Physical Therapy Progress Note     Patient: Jaquan Schwab  : 1975    Beginning/End Dates of Reporting Period:  5/10/2019 to 2019    Referring Provider: Rikki Cardenas MD    Therapy Diagnosis: Decreased joint and tissue mobility of lumbar, pelvis, and sacrum.  Post op lumbar microdiscectomy and laminectomy.     Client Self Report: Last week followed up with surgeon, had cortisone injection into L hip and feels it has been helping.  Pain less since injection in the hip and less leg symptoms.  Pt states feels tight in the L leg and pelvis is still off.  Pt reports the stairs are getting better.  Mornings are still the toughest for pain and stiffness.    Objective Measurements:  Objective Measure: Palpation  Details: Positive FADIRs, IR and ER restrictions with motion, tenderness across sacral base and L SI.  Objective Measure: Strength  Details: L gluteal weakness.    Outcome Measures (most recent score):  Nikolay STarT Sub-Score (Q5-9): 3  Nikolay STarT Total Score (all 9): 6  Oswestry Score (%): 54 %    Goals:  Goal Identifier #1   Goal Description Pt will demonstrate ability to walk 10,0000 steps for average per week without increased back or leg pain in order to return to PLOF.   Target Date 19   Date Met  (Averaging 2484-4746 steps, 3-4/10 pain)   Progress:     Goal Identifier #2   Goal Description Pt will demonstrate ability to sit and travel in vehicle for 2 hours without increased back pain in order to travel for work.   Target Date 19   Date Met      Progress:     Goal Identifier #3   Goal Description Pt will report <20% overall on the CATHERINE demonstrating improved functional mobility and less pain.   Target Date 19   Date Met      Progress:     Progress Toward Goals:   Progress this reporting period: Pt has been progressing slowly towards therapy goals.  He continues to demonstrate L side gluteal and LE weakness.  Dealing with potential hip joint and bursitis like symptoms.  Did  have injection which he feels is helping.  Pt struggles with tightness of muscles and joints around sacrum and pelvis, weakness, poor core stability, and alignment issues.  PT has been focusing on strength, stability, mobility, and alignment, along with symptom management.  PT recommends pt continue with skilled services to due progress and impairments.    Plan:  Continue therapy per current plan of care.    Discharge:  No    Thank you for your referral.    Lucia Long, PT, DPT  [facility]  [phone]

## 2019-07-09 ENCOUNTER — HOSPITAL ENCOUNTER (OUTPATIENT)
Dept: PHYSICAL THERAPY | Facility: CLINIC | Age: 44
Setting detail: THERAPIES SERIES
End: 2019-07-09
Attending: NEUROLOGICAL SURGERY
Payer: COMMERCIAL

## 2019-07-09 PROCEDURE — 97140 MANUAL THERAPY 1/> REGIONS: CPT | Mod: GP

## 2019-07-09 PROCEDURE — 97110 THERAPEUTIC EXERCISES: CPT | Mod: GP

## 2019-07-16 ENCOUNTER — HOSPITAL ENCOUNTER (OUTPATIENT)
Dept: PHYSICAL THERAPY | Facility: CLINIC | Age: 44
Setting detail: THERAPIES SERIES
End: 2019-07-16
Attending: NEUROLOGICAL SURGERY
Payer: COMMERCIAL

## 2019-07-16 PROCEDURE — 97110 THERAPEUTIC EXERCISES: CPT | Mod: GP

## 2019-07-16 PROCEDURE — 97140 MANUAL THERAPY 1/> REGIONS: CPT | Mod: GP

## 2019-07-25 ENCOUNTER — HOSPITAL ENCOUNTER (OUTPATIENT)
Dept: PHYSICAL THERAPY | Facility: CLINIC | Age: 44
Setting detail: THERAPIES SERIES
End: 2019-07-25
Attending: NEUROLOGICAL SURGERY
Payer: COMMERCIAL

## 2019-07-25 PROCEDURE — 97110 THERAPEUTIC EXERCISES: CPT | Mod: GP

## 2019-07-30 ENCOUNTER — HOSPITAL ENCOUNTER (OUTPATIENT)
Dept: PHYSICAL THERAPY | Facility: CLINIC | Age: 44
Setting detail: THERAPIES SERIES
End: 2019-07-30
Attending: NEUROLOGICAL SURGERY
Payer: COMMERCIAL

## 2019-07-30 PROCEDURE — 97140 MANUAL THERAPY 1/> REGIONS: CPT | Mod: GP

## 2019-07-30 PROCEDURE — 97110 THERAPEUTIC EXERCISES: CPT | Mod: GP

## 2019-08-06 ENCOUNTER — HOSPITAL ENCOUNTER (OUTPATIENT)
Dept: PHYSICAL THERAPY | Facility: CLINIC | Age: 44
Setting detail: THERAPIES SERIES
End: 2019-08-06
Attending: NEUROLOGICAL SURGERY
Payer: COMMERCIAL

## 2019-08-06 PROCEDURE — 97140 MANUAL THERAPY 1/> REGIONS: CPT | Mod: GP

## 2019-08-06 PROCEDURE — 97110 THERAPEUTIC EXERCISES: CPT | Mod: GP

## 2019-08-06 NOTE — PROGRESS NOTES
Outpatient Physical Therapy Progress Note     Patient: Jaquan Schwab  : 1975    Beginning/End Dates of Reporting Period:  2019 to 2019    Referring Provider: Rikki Patterson MD    Therapy Diagnosis: Decreased joint and tissue mobility of lumbar, pelvis and sacrum, post op lumbar laminectomy     Client Self Report: Per pt he did 10,000 steps the last 3 days also went golfing and did good!    Outcome Measure:  CATHERINE 44%    Goals:  Goal Identifier #1   Goal Description Pt will demonstrate ability to walk 10,0000 steps for average per week without increased back or leg pain in order to return to PLOF.   Target Date 19   Date Met  (Averaging 9768-7794 steps, 3-4/10 pain)   Progress:     Goal Identifier #2   Goal Description Pt will demonstrate ability to sit and travel in vehicle for 2 hours without increased back pain in order to travel for work.   Target Date 19   Date Met      Progress:     Goal Identifier #3   Goal Description Pt will report <20% overall on the CATHERINE demonstrating improved functional mobility and less pain.   Target Date 19   Date Met      Progress:     Progress Toward Goals:   Progress this reporting period: Pt has made good progress, is able to sit in car for 1-1.5 hours, average 6-7,000 steps a day and has had minimal pain more stiffness in low back.  Pt has also jessica very good about completing stretches/HEP.       Plan:  Continue therapy per current plan of care.    Discharge:  No

## 2019-08-13 ENCOUNTER — HOSPITAL ENCOUNTER (OUTPATIENT)
Dept: PHYSICAL THERAPY | Facility: CLINIC | Age: 44
Setting detail: THERAPIES SERIES
End: 2019-08-13
Attending: NEUROLOGICAL SURGERY
Payer: COMMERCIAL

## 2019-08-13 PROCEDURE — 97110 THERAPEUTIC EXERCISES: CPT | Mod: GP

## 2019-08-13 PROCEDURE — 97140 MANUAL THERAPY 1/> REGIONS: CPT | Mod: GP

## 2019-08-20 ENCOUNTER — HOSPITAL ENCOUNTER (OUTPATIENT)
Dept: PHYSICAL THERAPY | Facility: CLINIC | Age: 44
Setting detail: THERAPIES SERIES
End: 2019-08-20
Attending: NEUROLOGICAL SURGERY
Payer: COMMERCIAL

## 2019-08-20 PROCEDURE — 97140 MANUAL THERAPY 1/> REGIONS: CPT | Mod: GP

## 2019-08-20 PROCEDURE — 97110 THERAPEUTIC EXERCISES: CPT | Mod: GP

## 2019-09-03 ENCOUNTER — HOSPITAL ENCOUNTER (OUTPATIENT)
Dept: PHYSICAL THERAPY | Facility: CLINIC | Age: 44
Setting detail: THERAPIES SERIES
End: 2019-09-03
Attending: NEUROLOGICAL SURGERY
Payer: COMMERCIAL

## 2019-09-03 PROCEDURE — 97110 THERAPEUTIC EXERCISES: CPT | Mod: GP

## 2019-09-03 PROCEDURE — 97140 MANUAL THERAPY 1/> REGIONS: CPT | Mod: GP

## 2019-09-10 ENCOUNTER — HOSPITAL ENCOUNTER (OUTPATIENT)
Dept: PHYSICAL THERAPY | Facility: CLINIC | Age: 44
Setting detail: THERAPIES SERIES
End: 2019-09-10
Attending: NEUROLOGICAL SURGERY
Payer: COMMERCIAL

## 2019-09-10 PROCEDURE — 97110 THERAPEUTIC EXERCISES: CPT | Mod: GP

## 2019-09-10 NOTE — PROGRESS NOTES
Outpatient Physical Therapy Discharge Note     Patient: Jaquan Schwab  : 1975    Beginning/End Dates of Reporting Period:  2019 to 9/10/2019    Referring Provider: Rikki Patterson MD     Therapy Diagnosis: Decreased joint and tissue mobility of lumbar, pelvis and sacrum, post op lumbar laminectomy     Client Self Report: Patient was at a concert this weekend and was able to walk over 20,000 steps per day without pain. He was sore and stiff in the morning, however this was mostly resolved with stretches. Still feels tightness in back when laying down at night. Still cautious with some movements like squatting and lifting. Feels prepared to discharge with home program.     Objective Measurements:  CATHERINE: 38%  Nikolay: Low Risk    Outcome Measures (most recent score):  Nikolay STarT Sub-Score (Q5-9): 0  Nikolay STarT Total Score (all 9): 2  Oswestry Score (%): 38 %    Goals:  Goal Identifier #1   Goal Description Pt will demonstrate ability to walk 10,0000 steps for average per week without increased back or leg pain in order to return to PLOF.   Target Date 19   Date Met  09/10/19   Progress: walked 20,000 steps x 3 days in a row this weekend     Goal Identifier #2   Goal Description Pt will demonstrate ability to sit and travel in vehicle for 2 hours without increased back pain in order to travel for work.   Target Date 19   Date Met  09/10/19   Progress: Prefers taking breaks from driving every 1-1.5 hours, however able to complete 2 hours without pain.     Goal Identifier #3   Goal Description Pt will report <20% overall on the CATHERINE demonstrating improved functional mobility and less pain.   Target Date 19   Date Met   38%       Progress Toward Goals:   Progress this reporting period: Fly continues to make progress with physical therapy goals. He is able to drive 1-2 hours at a time without pain and is compliant with taking driving breaks to walk and stretch. He reports ability to play golf and  walk >20,000 steps per day without increased pain. He does report mild discomfort in low back when laying in bed at night, however reports stretches help this discomfort. Fly demonstrates all home exercises today without pain and exhibits good form with squatting and lifting 10# box (work requirement). Fly is ready to discharge from physical therapy with home program.         Plan:  Discharge from therapy.    Discharge:    Reason for Discharge: see above.    Equipment Issued: theraband    Discharge Plan: Patient to continue home program.      Thank you for your referral.     Patricia Arias PT, DPT    [facility]    O: [phone]  E: [email or web address]

## 2020-02-16 ENCOUNTER — HEALTH MAINTENANCE LETTER (OUTPATIENT)
Age: 45
End: 2020-02-16

## 2020-11-29 ENCOUNTER — HEALTH MAINTENANCE LETTER (OUTPATIENT)
Age: 45
End: 2020-11-29

## 2021-04-10 ENCOUNTER — HEALTH MAINTENANCE LETTER (OUTPATIENT)
Age: 46
End: 2021-04-10

## 2021-09-19 ENCOUNTER — HEALTH MAINTENANCE LETTER (OUTPATIENT)
Age: 46
End: 2021-09-19

## 2021-10-19 ENCOUNTER — MEDICAL CORRESPONDENCE (OUTPATIENT)
Dept: HEALTH INFORMATION MANAGEMENT | Facility: CLINIC | Age: 46
End: 2021-10-19

## 2021-10-19 ENCOUNTER — TRANSFERRED RECORDS (OUTPATIENT)
Dept: HEALTH INFORMATION MANAGEMENT | Facility: CLINIC | Age: 46
End: 2021-10-19

## 2021-10-26 DIAGNOSIS — R07.9 CHEST PAIN, UNSPECIFIED TYPE: Primary | ICD-10-CM

## 2021-10-26 NOTE — PROGRESS NOTES
Orders received for stress echocardiogram from [facility] signed by Nathalie Lott NP. Diagnosis chest pain.     Tel: [phone]

## 2021-11-08 ENCOUNTER — HOSPITAL ENCOUNTER (OUTPATIENT)
Dept: CARDIOLOGY | Facility: CLINIC | Age: 46
Discharge: HOME | End: 2021-11-08
Admitting: NURSE PRACTITIONER
Payer: COMMERCIAL

## 2021-11-08 DIAGNOSIS — R07.9 CHEST PAIN, UNSPECIFIED TYPE: ICD-10-CM

## 2021-11-08 PROCEDURE — 93016 CV STRESS TEST SUPVJ ONLY: CPT | Performed by: INTERNAL MEDICINE

## 2021-11-08 PROCEDURE — 999N000208 ECHO STRESS ECHOCARDIOGRAM

## 2021-11-08 PROCEDURE — 255N000002 HC RX 255 OP 636

## 2021-11-08 PROCEDURE — 93350 STRESS TTE ONLY: CPT | Mod: 26 | Performed by: INTERNAL MEDICINE

## 2021-11-08 PROCEDURE — 93325 DOPPLER ECHO COLOR FLOW MAPG: CPT | Mod: 26 | Performed by: INTERNAL MEDICINE

## 2021-11-08 PROCEDURE — 93321 DOPPLER ECHO F-UP/LMTD STD: CPT | Mod: 26 | Performed by: INTERNAL MEDICINE

## 2021-11-08 PROCEDURE — 93018 CV STRESS TEST I&R ONLY: CPT | Performed by: INTERNAL MEDICINE

## 2021-11-08 RX ADMIN — HUMAN ALBUMIN MICROSPHERES AND PERFLUTREN 2 ML: 10; .22 INJECTION, SOLUTION INTRAVENOUS at 10:19

## 2021-12-21 ENCOUNTER — DOCUMENTATION ONLY (OUTPATIENT)
Dept: EMERGENCY MEDICINE | Facility: CLINIC | Age: 46
End: 2021-12-21

## 2021-12-21 ENCOUNTER — APPOINTMENT (OUTPATIENT)
Dept: CT IMAGING | Facility: CLINIC | Age: 46
End: 2021-12-21
Attending: EMERGENCY MEDICINE
Payer: COMMERCIAL

## 2021-12-21 ENCOUNTER — HOSPITAL ENCOUNTER (EMERGENCY)
Facility: CLINIC | Age: 46
Discharge: HOME | End: 2021-12-21
Attending: EMERGENCY MEDICINE | Admitting: EMERGENCY MEDICINE
Payer: COMMERCIAL

## 2021-12-21 VITALS
RESPIRATION RATE: 24 BRPM | OXYGEN SATURATION: 90 % | BODY MASS INDEX: 31.25 KG/M2 | WEIGHT: 211 LBS | DIASTOLIC BLOOD PRESSURE: 83 MMHG | SYSTOLIC BLOOD PRESSURE: 116 MMHG | TEMPERATURE: 100.7 F | HEART RATE: 103 BPM | HEIGHT: 69 IN

## 2021-12-21 DIAGNOSIS — U07.1 INFECTION DUE TO 2019 NOVEL CORONAVIRUS: ICD-10-CM

## 2021-12-21 LAB
ALBUMIN SERPL-MCNC: 3.1 G/DL (ref 3.4–5)
ALP SERPL-CCNC: 95 U/L (ref 40–150)
ALT SERPL W P-5'-P-CCNC: 33 U/L (ref 0–70)
ANION GAP SERPL CALCULATED.3IONS-SCNC: 9 MMOL/L (ref 3–14)
AST SERPL W P-5'-P-CCNC: 21 U/L (ref 0–45)
BASOPHILS # BLD AUTO: 0 10E3/UL (ref 0–0.2)
BASOPHILS NFR BLD AUTO: 1 %
BILIRUB SERPL-MCNC: 0.6 MG/DL (ref 0.2–1.3)
BUN SERPL-MCNC: 11 MG/DL (ref 7–30)
CALCIUM SERPL-MCNC: 8.6 MG/DL (ref 8.5–10.1)
CHLORIDE BLD-SCNC: 104 MMOL/L (ref 94–109)
CO2 SERPL-SCNC: 20 MMOL/L (ref 20–32)
CREAT SERPL-MCNC: 0.58 MG/DL (ref 0.66–1.25)
CRP SERPL-MCNC: 83.5 MG/L (ref 0–8)
D DIMER PPP FEU-MCNC: 0.8 UG/ML FEU (ref 0–0.5)
EOSINOPHIL # BLD AUTO: 0 10E3/UL (ref 0–0.7)
EOSINOPHIL NFR BLD AUTO: 0 %
ERYTHROCYTE [DISTWIDTH] IN BLOOD BY AUTOMATED COUNT: 12.2 % (ref 10–15)
FLUAV RNA SPEC QL NAA+PROBE: NEGATIVE
FLUBV RNA RESP QL NAA+PROBE: NEGATIVE
GFR SERPL CREATININE-BSD FRML MDRD: >90 ML/MIN/1.73M2
GLUCOSE BLD-MCNC: 317 MG/DL (ref 70–99)
HCT VFR BLD AUTO: 47 % (ref 40–53)
HGB BLD-MCNC: 16.2 G/DL (ref 13.3–17.7)
IMM GRANULOCYTES # BLD: 0 10E3/UL
IMM GRANULOCYTES NFR BLD: 1 %
LACTATE SERPL-SCNC: 1.3 MMOL/L (ref 0.7–2)
LYMPHOCYTES # BLD AUTO: 0.3 10E3/UL (ref 0.8–5.3)
LYMPHOCYTES NFR BLD AUTO: 17 %
MCH RBC QN AUTO: 29.1 PG (ref 26.5–33)
MCHC RBC AUTO-ENTMCNC: 34.5 G/DL (ref 31.5–36.5)
MCV RBC AUTO: 84 FL (ref 78–100)
MONOCYTES # BLD AUTO: 0.2 10E3/UL (ref 0–1.3)
MONOCYTES NFR BLD AUTO: 9 %
NEUTROPHILS # BLD AUTO: 1.4 10E3/UL (ref 1.6–8.3)
NEUTROPHILS NFR BLD AUTO: 72 %
NRBC # BLD AUTO: 0 10E3/UL
NRBC BLD AUTO-RTO: 0 /100
PLATELET # BLD AUTO: 108 10E3/UL (ref 150–450)
POTASSIUM BLD-SCNC: 4.2 MMOL/L (ref 3.4–5.3)
PROT SERPL-MCNC: 7.1 G/DL (ref 6.8–8.8)
RBC # BLD AUTO: 5.57 10E6/UL (ref 4.4–5.9)
SARS-COV-2 RNA RESP QL NAA+PROBE: POSITIVE
SODIUM SERPL-SCNC: 133 MMOL/L (ref 133–144)
WBC # BLD AUTO: 1.9 10E3/UL (ref 4–11)

## 2021-12-21 PROCEDURE — 85379 FIBRIN DEGRADATION QUANT: CPT | Performed by: EMERGENCY MEDICINE

## 2021-12-21 PROCEDURE — 250N000011 HC RX IP 250 OP 636: Performed by: EMERGENCY MEDICINE

## 2021-12-21 PROCEDURE — 36415 COLL VENOUS BLD VENIPUNCTURE: CPT | Performed by: EMERGENCY MEDICINE

## 2021-12-21 PROCEDURE — 99284 EMERGENCY DEPT VISIT MOD MDM: CPT | Performed by: EMERGENCY MEDICINE

## 2021-12-21 PROCEDURE — 71275 CT ANGIOGRAPHY CHEST: CPT

## 2021-12-21 PROCEDURE — 80053 COMPREHEN METABOLIC PANEL: CPT | Performed by: EMERGENCY MEDICINE

## 2021-12-21 PROCEDURE — 96375 TX/PRO/DX INJ NEW DRUG ADDON: CPT | Mod: 59 | Performed by: EMERGENCY MEDICINE

## 2021-12-21 PROCEDURE — 999N000156 HC STATISTIC RCP CONSULT EA 30 MIN

## 2021-12-21 PROCEDURE — 258N000003 HC RX IP 258 OP 636: Performed by: EMERGENCY MEDICINE

## 2021-12-21 PROCEDURE — 96365 THER/PROPH/DIAG IV INF INIT: CPT | Mod: 59 | Performed by: EMERGENCY MEDICINE

## 2021-12-21 PROCEDURE — 83605 ASSAY OF LACTIC ACID: CPT | Performed by: EMERGENCY MEDICINE

## 2021-12-21 PROCEDURE — 85014 HEMATOCRIT: CPT | Performed by: EMERGENCY MEDICINE

## 2021-12-21 PROCEDURE — 86140 C-REACTIVE PROTEIN: CPT | Performed by: EMERGENCY MEDICINE

## 2021-12-21 PROCEDURE — 99285 EMERGENCY DEPT VISIT HI MDM: CPT | Mod: 25 | Performed by: EMERGENCY MEDICINE

## 2021-12-21 PROCEDURE — 87040 BLOOD CULTURE FOR BACTERIA: CPT | Performed by: EMERGENCY MEDICINE

## 2021-12-21 PROCEDURE — 999N000157 HC STATISTIC RCP TIME EA 10 MIN

## 2021-12-21 PROCEDURE — 250N000009 HC RX 250: Performed by: EMERGENCY MEDICINE

## 2021-12-21 PROCEDURE — C9803 HOPD COVID-19 SPEC COLLECT: HCPCS | Performed by: EMERGENCY MEDICINE

## 2021-12-21 PROCEDURE — 82040 ASSAY OF SERUM ALBUMIN: CPT | Performed by: EMERGENCY MEDICINE

## 2021-12-21 PROCEDURE — 87636 SARSCOV2 & INF A&B AMP PRB: CPT | Performed by: EMERGENCY MEDICINE

## 2021-12-21 RX ORDER — KETOROLAC TROMETHAMINE 30 MG/ML
30 INJECTION, SOLUTION INTRAMUSCULAR; INTRAVENOUS ONCE
Status: COMPLETED | OUTPATIENT
Start: 2021-12-21 | End: 2021-12-21

## 2021-12-21 RX ORDER — DEXAMETHASONE 6 MG/1
6 TABLET ORAL DAILY
Qty: 5 TABLET | Refills: 0 | Status: SHIPPED | OUTPATIENT
Start: 2021-12-21 | End: 2021-12-26

## 2021-12-21 RX ORDER — IOPAMIDOL 755 MG/ML
500 INJECTION, SOLUTION INTRAVASCULAR ONCE
Status: COMPLETED | OUTPATIENT
Start: 2021-12-21 | End: 2021-12-21

## 2021-12-21 RX ORDER — DEXAMETHASONE SODIUM PHOSPHATE 10 MG/ML
10 INJECTION, SOLUTION INTRAMUSCULAR; INTRAVENOUS ONCE
Status: COMPLETED | OUTPATIENT
Start: 2021-12-21 | End: 2021-12-21

## 2021-12-21 RX ADMIN — SODIUM CHLORIDE 1000 ML: 9 INJECTION, SOLUTION INTRAVENOUS at 07:50

## 2021-12-21 RX ADMIN — REMDESIVIR 200 MG: 100 INJECTION, POWDER, LYOPHILIZED, FOR SOLUTION INTRAVENOUS at 08:30

## 2021-12-21 RX ADMIN — SODIUM CHLORIDE, POTASSIUM CHLORIDE, SODIUM LACTATE AND CALCIUM CHLORIDE 500 ML: 600; 310; 30; 20 INJECTION, SOLUTION INTRAVENOUS at 06:33

## 2021-12-21 RX ADMIN — KETOROLAC TROMETHAMINE 30 MG: 30 INJECTION, SOLUTION INTRAMUSCULAR at 06:34

## 2021-12-21 RX ADMIN — SODIUM CHLORIDE 70 ML: 9 INJECTION, SOLUTION INTRAVENOUS at 07:03

## 2021-12-21 RX ADMIN — DEXAMETHASONE SODIUM PHOSPHATE 10 MG: 10 INJECTION, SOLUTION INTRAMUSCULAR; INTRAVENOUS at 07:50

## 2021-12-21 RX ADMIN — IOPAMIDOL 75 ML: 755 INJECTION, SOLUTION INTRAVENOUS at 07:03

## 2021-12-21 ASSESSMENT — MIFFLIN-ST. JEOR: SCORE: 1827.47

## 2021-12-21 NOTE — ED PROVIDER NOTES
Patient signed out to me at 7 AM at change of shift by Dr. Hermosillo.  Patient with Covid pneumonia.  Pending CT of the chest.     Results for orders placed or performed during the hospital encounter of 12/21/21   CT Chest Pulmonary Embolism w Contrast     Status: None    Narrative    CT CHEST PULMONARY EMBOLISM W CONTRAST 12/21/2021 7:26 AM    CLINICAL HISTORY: PE suspected, high prob, positive COVID 19,  increased d-dimer, shortness of air, fever, cough, shortness of breath  TECHNIQUE: CT angiogram chest during arterial phase injection IV  contrast. 2D and 3D MIP reconstructions were performed by the CT  technologist. Dose reduction techniques were used.     CONTRAST: 75mL, [id number]    COMPARISON: None.    FINDINGS:  ANGIOGRAM CHEST: Pulmonary arteries are normal caliber and negative  for pulmonary emboli. Thoracic aorta is negative for dissection. No CT  evidence of right heart strain.    LUNGS AND PLEURA: The central airways are clear. Moderate bilateral  multilobar patchy consolidative and groundglass airspace opacities,  most pronounced within the lower lobes. No pleural effusion.    MEDIASTINUM/AXILLAE: Mild bilateral hilar adenopathy is likely  reactive. Normal heart size and no pericardial effusion.    UPPER ABDOMEN: Normal.    MUSCULOSKELETAL: Normal.      Impression    IMPRESSION:  1.  No pulmonary artery embolism.  2.  Moderate bilateral multilobar airspace disease consistent with  COVID 19 pneumonia. No pleural effusion.    LOENA TORRES MD         SYSTEM ID:  [id number]   D dimer quantitative     Status: Abnormal   Result Value Ref Range    D-Dimer Quantitative 0.80 (H) 0.00 - 0.50 ug/mL FEU    Narrative    This D-dimer assay is intended for use in conjunction with a clinical pretest probability assessment model to exclude pulmonary embolism (PE) and deep venous thrombosis (DVT) in outpatients suspected of PE or DVT. The cut-off value is 0.50 ug/mL FEU.   Comprehensive metabolic panel     Status:  Abnormal   Result Value Ref Range    Sodium 133 133 - 144 mmol/L    Potassium 4.2 3.4 - 5.3 mmol/L    Chloride 104 94 - 109 mmol/L    Carbon Dioxide (CO2) 20 20 - 32 mmol/L    Anion Gap 9 3 - 14 mmol/L    Urea Nitrogen 11 7 - 30 mg/dL    Creatinine 0.58 (L) 0.66 - 1.25 mg/dL    Calcium 8.6 8.5 - 10.1 mg/dL    Glucose 317 (H) 70 - 99 mg/dL    Alkaline Phosphatase 95 40 - 150 U/L    AST 21 0 - 45 U/L    ALT 33 0 - 70 U/L    Protein Total 7.1 6.8 - 8.8 g/dL    Albumin 3.1 (L) 3.4 - 5.0 g/dL    Bilirubin Total 0.6 0.2 - 1.3 mg/dL    GFR Estimate >90 >60 mL/min/1.73m2   Lactic acid whole blood     Status: Normal   Result Value Ref Range    Lactic Acid 1.3 0.7 - 2.0 mmol/L   CRP inflammation     Status: Abnormal   Result Value Ref Range    CRP Inflammation 83.5 (H) 0.0 - 8.0 mg/L   Symptomatic; Yes; 12/14/2021 Influenza A/B & SARS-CoV2 (COVID-19) Virus PCR Multiplex Nasopharyngeal     Status: Abnormal    Specimen: Nasopharyngeal; Swab   Result Value Ref Range    Influenza A PCR Negative Negative    Influenza B PCR Negative Negative    SARS CoV2 PCR Positive (A) Negative    Narrative    Testing was performed using the [employer] SARS-CoV-2 & Influenza A/B Assay on the [employer] Maryann System. This test should be ordered for the detection of SARS-CoV-2 and influenza viruses in individuals who meet clinical and/or epidemiological criteria. Test performance is unknown in asymptomatic patients. This test is for in vitro diagnostic use under the FDA EUA for laboratories certified under CLIA to perform moderate and/or high complexity testing. This test has not been FDA cleared or approved. A negative result does not rule out the presence of PCR inhibitors in the specimen or target RNA in concentration below the limit of detection for the assay. If only one viral target is positive but coinfection with multiple targets is suspected, the sample should be re-tested with another FDA cleared, approved or authorized test, if coinfection would  change clinical management. [facility] are certified under the Clinical Laboratory Improvement Amendments of 1988 (CLIA-88) as  qualified to perform moderate and/or high complexity laboratory testing.   CBC with platelets and differential     Status: Abnormal   Result Value Ref Range    WBC Count 1.9 (L) 4.0 - 11.0 10e3/uL    RBC Count 5.57 4.40 - 5.90 10e6/uL    Hemoglobin 16.2 13.3 - 17.7 g/dL    Hematocrit 47.0 40.0 - 53.0 %    MCV 84 78 - 100 fL    MCH 29.1 26.5 - 33.0 pg    MCHC 34.5 31.5 - 36.5 g/dL    RDW 12.2 10.0 - 15.0 %    Platelet Count 108 (L) 150 - 450 10e3/uL    % Neutrophils 72 %    % Lymphocytes 17 %    % Monocytes 9 %    % Eosinophils 0 %    % Basophils 1 %    % Immature Granulocytes 1 %    NRBCs per 100 WBC 0 <1 /100    Absolute Neutrophils 1.4 (L) 1.6 - 8.3 10e3/uL    Absolute Lymphocytes 0.3 (L) 0.8 - 5.3 10e3/uL    Absolute Monocytes 0.2 0.0 - 1.3 10e3/uL    Absolute Eosinophils 0.0 0.0 - 0.7 10e3/uL    Absolute Basophils 0.0 0.0 - 0.2 10e3/uL    Absolute Immature Granulocytes 0.0 <=0.4 10e3/uL    Absolute NRBCs 0.0 10e3/uL   CBC with platelets differential     Status: Abnormal    Narrative    The following orders were created for panel order CBC with platelets differential.  Procedure                               Abnormality         Status                     ---------                               -----------         ------                     CBC with platelets and d...[[id number]]  Abnormal            Final result                 Please view results for these tests on the individual orders.       At rest, oxygen saturations  dipped down to 88%.  I discussed treatment options with patient and would recommend steroids and remdesivir which she is in agreement with.  This is initiated.    We were able to get him plugged into home oxygen program in the get well loop which he appears appropriate for.  Placed on further steroids for another 5 days.  Return at any time if oxygen  saturations are going below 89% despite 3 L nasal cannula.        Oxygen Documentation:   I certify that this patient, Jaquan Schwab has been under my care (or a nurse practitioner or physican's assistant working with me). This is the face-to-face encounter for oxygen medical necessity.      Jaquan Schwab is now in a chronic stable state and continues to require supplemental oxygen. Patient has continued oxygen desaturation due to COVID pneumonia.    Alternative treatment(s) tried or considered and deemed clinically infective for treatment of COVID pneumonia include steroids.  If portability is ordered, is the patient mobile within the home? yes    **Patients who qualify for home O2 coverage under the CMS guidelines require ABG tests or O2 sat readings obtained closest to, but no earlier than 2 days prior to the discharge, as evidence of the need for home oxygen therapy. Testing must be performed while patient is in the chronic stable state. See notes for O2 sats.**    Assessment:  Covid pneumonia.    Plan:  Home oxygen.  Discussed treatment options and patient would like to return home on home oxygen treatment plan.  Referred to the get well loop.  Initiated on Decadron and first dose of remdesivir here.     Gunner Strange MD  12/21/21 [id number]

## 2021-12-21 NOTE — PROGRESS NOTES
...Patient has been assessed for Home Oxygen needs.  Oxygen readings:   *   RA - at rest  Pulse oximetry SPO2 92 %  *   RA - during activity/with exercise SPO2 89 %    Patient did not qualify for home oxygen at this time

## 2021-12-21 NOTE — PROGRESS NOTES
Received oxygen intake, reviewed chart; All documents are present including a valid order for home oxygen.  10:53am- I spoke with Shelley, to confirm we had everything we needed.  My coworker is out today so Shelley offered to deliver the oxygen.

## 2021-12-21 NOTE — DISCHARGE INSTRUCTIONS
May use Tylenol up to 1000 mg 4 times a day as needed for fever.  May use ibuprofen up to 600 mg 4 times a day as well.  Push fluids at home.      Oxygen Provider:  Arranged through [employer], contact number [phone].  If you have any questions or concerns please call the oxygen company directly.

## 2021-12-21 NOTE — ED PROVIDER NOTES
"  History     Chief Complaint   Patient presents with     Viral Syndrome     HPI  Jaquan Schwab is a 46 year old male who presents to the emergency room for feeling ill.  States that symptoms started 1 week ago.  Experiencing fever up to 101  F, cough, GI upset, shortness of breath, body aches.  Did not take any medication before coming to the emergency room today, last dose of Tylenol or ibuprofen was last night.  He took a home Covid test yesterday which was negative.  Continues to feel ill.  Was not immunized against COVID-19 or influenza.    Allergies:  No Known Allergies    Problem List:    There are no problems to display for this patient.       Past Medical History:    No past medical history on file.    Past Surgical History:    No past surgical history on file.    Family History:    No family history on file.    Social History:  Marital Status:  Single [1]  Social History     Tobacco Use     Smoking status: Not on file     Smokeless tobacco: Not on file   Substance Use Topics     Alcohol use: Not on file     Drug use: Not on file        Medications:    No current outpatient medications on file.        Review of Systems   All other systems reviewed and are negative.      Physical Exam   BP: (!) 128/92  Pulse: (!) 121  Temp: (!) 103.1  F (39.5  C)  Resp: 18  Height: 175.3 cm (5' 9\")  Weight: 95.7 kg (211 lb)  SpO2: 93 %      Physical Exam  Vitals and nursing note reviewed.   Constitutional:       General: He is not in acute distress.     Appearance: He is obese. He is ill-appearing. He is not diaphoretic.   HENT:      Head: Atraumatic.   Eyes:      General: No scleral icterus.     Pupils: Pupils are equal, round, and reactive to light.   Cardiovascular:      Rate and Rhythm: Regular rhythm. Tachycardia present.      Heart sounds: Normal heart sounds.   Pulmonary:      Effort: Pulmonary effort is normal. No respiratory distress.      Breath sounds: Normal breath sounds. No stridor.   Abdominal:      General: " Bowel sounds are normal.      Palpations: Abdomen is soft.      Tenderness: There is no abdominal tenderness.   Musculoskeletal:         General: No tenderness.   Skin:     General: Skin is warm.      Findings: No rash.   Neurological:      Mental Status: He is alert.         ED Course                 Procedures              Critical Care time:  none               Results for orders placed or performed during the hospital encounter of 12/21/21 (from the past 24 hour(s))   CBC with platelets differential    Narrative    The following orders were created for panel order CBC with platelets differential.  Procedure                               Abnormality         Status                     ---------                               -----------         ------                     CBC with platelets and d...[[id number]]  Abnormal            Final result                 Please view results for these tests on the individual orders.   D dimer quantitative   Result Value Ref Range    D-Dimer Quantitative 0.80 (H) 0.00 - 0.50 ug/mL FEU    Narrative    This D-dimer assay is intended for use in conjunction with a clinical pretest probability assessment model to exclude pulmonary embolism (PE) and deep venous thrombosis (DVT) in outpatients suspected of PE or DVT. The cut-off value is 0.50 ug/mL FEU.   Comprehensive metabolic panel   Result Value Ref Range    Sodium 133 133 - 144 mmol/L    Potassium 4.2 3.4 - 5.3 mmol/L    Chloride 104 94 - 109 mmol/L    Carbon Dioxide (CO2) 20 20 - 32 mmol/L    Anion Gap 9 3 - 14 mmol/L    Urea Nitrogen 11 7 - 30 mg/dL    Creatinine 0.58 (L) 0.66 - 1.25 mg/dL    Calcium 8.6 8.5 - 10.1 mg/dL    Glucose 317 (H) 70 - 99 mg/dL    Alkaline Phosphatase 95 40 - 150 U/L    AST 21 0 - 45 U/L    ALT 33 0 - 70 U/L    Protein Total 7.1 6.8 - 8.8 g/dL    Albumin 3.1 (L) 3.4 - 5.0 g/dL    Bilirubin Total 0.6 0.2 - 1.3 mg/dL    GFR Estimate >90 >60 mL/min/1.73m2   Lactic acid whole blood   Result Value Ref Range     Lactic Acid 1.3 0.7 - 2.0 mmol/L   CRP inflammation   Result Value Ref Range    CRP Inflammation 83.5 (H) 0.0 - 8.0 mg/L   CBC with platelets and differential   Result Value Ref Range    WBC Count 1.9 (L) 4.0 - 11.0 10e3/uL    RBC Count 5.57 4.40 - 5.90 10e6/uL    Hemoglobin 16.2 13.3 - 17.7 g/dL    Hematocrit 47.0 40.0 - 53.0 %    MCV 84 78 - 100 fL    MCH 29.1 26.5 - 33.0 pg    MCHC 34.5 31.5 - 36.5 g/dL    RDW 12.2 10.0 - 15.0 %    Platelet Count 108 (L) 150 - 450 10e3/uL    % Neutrophils 72 %    % Lymphocytes 17 %    % Monocytes 9 %    % Eosinophils 0 %    % Basophils 1 %    % Immature Granulocytes 1 %    NRBCs per 100 WBC 0 <1 /100    Absolute Neutrophils 1.4 (L) 1.6 - 8.3 10e3/uL    Absolute Lymphocytes 0.3 (L) 0.8 - 5.3 10e3/uL    Absolute Monocytes 0.2 0.0 - 1.3 10e3/uL    Absolute Eosinophils 0.0 0.0 - 0.7 10e3/uL    Absolute Basophils 0.0 0.0 - 0.2 10e3/uL    Absolute Immature Granulocytes 0.0 <=0.4 10e3/uL    Absolute NRBCs 0.0 10e3/uL   Symptomatic; Yes; 12/14/2021 Influenza A/B & SARS-CoV2 (COVID-19) Virus PCR Multiplex Nasopharyngeal    Specimen: Nasopharyngeal; Swab   Result Value Ref Range    Influenza A PCR Negative Negative    Influenza B PCR Negative Negative    SARS CoV2 PCR Positive (A) Negative    Narrative    Testing was performed using the [employer] SARS-CoV-2 & Influenza A/B Assay on the [employer] Mary[employer] System. This test should be ordered for the detection of SARS-CoV-2 and influenza viruses in individuals who meet clinical and/or epidemiological criteria. Test performance is unknown in asymptomatic patients. This test is for in vitro diagnostic use under the FDA EUA for laboratories certified under CLIA to perform moderate and/or high complexity testing. This test has not been FDA cleared or approved. A negative result does not rule out the presence of PCR inhibitors in the specimen or target RNA in concentration below the limit of detection for the assay. If only one viral target is positive but  coinfection with multiple targets is suspected, the sample should be re-tested with another FDA cleared, approved or authorized test, if coinfection would change clinical management. [facility] are certified under the Clinical Laboratory Improvement Amendments of 1988 (CLIA-88) as  qualified to perform moderate and/or high complexity laboratory testing.       Medications   lactated ringers BOLUS 500 mL (500 mLs Intravenous New Bag 12/21/21 0633)   sodium chloride 0.9 % bag 100mL for CT scan flush use (has no administration in time range)   iopamidol (ISOVUE-370) solution 500 mL (has no administration in time range)   ketorolac (TORADOL) injection 30 mg (30 mg Intravenous Given 12/21/21 0634)       Assessments & Plan (with Medical Decision Making)  Jaquan is a 46-year-old male who presents to the emergency room with 1 week of fever, shortness of breath, body aches, fatigue, and feeling unwell.  Home Covid test yesterday was negative.  Continues to be symptomatic.  See history and focused physical exam as above  Obese 46-year-old male in no acute respiratory distress but is ill-appearing, is febrile with temp of 103.1  F on arrival and is tachycardic, likely due to fever.  Normal respiratory rate and oxygenation on room air.  No acute worrisome findings on physical exam.  Suspect infection related to COVID-19.  Will swab patient for Covid and influenza, will also insert peripheral IV to give fluids, Toradol to help with fever and body aches, and will check lab work.  CBC, CMP, lactic, CRP, blood cultures are all pending.  Positive for COVID-19, negative for influenza. Elevated D-dimer and CRP, leukopenia, all consistent with COVID-19 infection. CT scan to rule out PE has been ordered. Improvement in pulse after IV fluids and Toradol  Will sign out at change of shift to Dr. Gunner Strange.  Will need to follow-up on imaging and ultimate disposition of the patient.     I have reviewed the nursing  notes.    I have reviewed the findings, diagnosis, plan and need for follow up with the patient.       New Prescriptions    No medications on file       Final diagnoses:   Infection due to 2019 novel coronavirus       12/21/2021   [facility] EMERGENCY DEPT     Deborah Hermosillo, DO  12/21/21 0634       Deborah Hermosillo, DO  12/21/21 0702

## 2021-12-21 NOTE — ED NOTES
Patient has been assessed for Home Oxygen needs. Oxygen readings:    *Pulse oximetry (SpO2) = 86% on room air at rest while awake.    *SpO2 improved to 94% on 2 liters/minute at rest.    *SpO2 improved to 90% on 2 liters/minute during activity/with exercise.

## 2021-12-21 NOTE — ED TRIAGE NOTES
Pt states here for viral illness. Reports one week of the following sx that started Tuesday the 14th including non productive cough, SOA, myalgia, nausea, and fever. Negative at home COVID test yesterday. Febrile in triage at 103.1 firing code sepsis.

## 2021-12-22 ENCOUNTER — HOSPITAL ENCOUNTER (INPATIENT)
Facility: CLINIC | Age: 46
LOS: 4 days | Discharge: HOME | End: 2021-12-26
Attending: FAMILY MEDICINE | Admitting: INTERNAL MEDICINE
Payer: COMMERCIAL

## 2021-12-22 DIAGNOSIS — E11.9 TYPE 2 DIABETES MELLITUS WITHOUT COMPLICATION, WITHOUT LONG-TERM CURRENT USE OF INSULIN (H): ICD-10-CM

## 2021-12-22 DIAGNOSIS — U07.1 PNEUMONIA DUE TO 2019 NOVEL CORONAVIRUS: ICD-10-CM

## 2021-12-22 DIAGNOSIS — R73.9 HYPERGLYCEMIA: ICD-10-CM

## 2021-12-22 DIAGNOSIS — J96.01 ACUTE RESPIRATORY FAILURE WITH HYPOXIA (H): ICD-10-CM

## 2021-12-22 DIAGNOSIS — J12.82 PNEUMONIA DUE TO 2019 NOVEL CORONAVIRUS: ICD-10-CM

## 2021-12-22 LAB
ABO/RH(D): NORMAL
ANION GAP SERPL CALCULATED.3IONS-SCNC: 8 MMOL/L (ref 3–14)
BASE EXCESS BLDV CALC-SCNC: -1 MMOL/L (ref -7.7–1.9)
BASOPHILS # BLD AUTO: 0 10E3/UL (ref 0–0.2)
BASOPHILS NFR BLD AUTO: 0 %
BUN SERPL-MCNC: 17 MG/DL (ref 7–30)
CALCIUM SERPL-MCNC: 8.8 MG/DL (ref 8.5–10.1)
CHLORIDE BLD-SCNC: 103 MMOL/L (ref 94–109)
CO2 SERPL-SCNC: 22 MMOL/L (ref 20–32)
CREAT SERPL-MCNC: 0.6 MG/DL (ref 0.66–1.25)
CRP SERPL-MCNC: 57.4 MG/L (ref 0–8)
D DIMER PPP FEU-MCNC: 0.44 UG/ML FEU (ref 0–0.5)
EOSINOPHIL # BLD AUTO: 0 10E3/UL (ref 0–0.7)
EOSINOPHIL NFR BLD AUTO: 0 %
ERYTHROCYTE [DISTWIDTH] IN BLOOD BY AUTOMATED COUNT: 12.2 % (ref 10–15)
GFR SERPL CREATININE-BSD FRML MDRD: >90 ML/MIN/1.73M2
GLUCOSE BLD-MCNC: 316 MG/DL (ref 70–99)
GLUCOSE BLDC GLUCOMTR-MCNC: 309 MG/DL (ref 70–99)
GLUCOSE BLDC GLUCOMTR-MCNC: 350 MG/DL (ref 70–99)
HBA1C MFR BLD: 7.9 % (ref 0–5.6)
HCO3 BLDV-SCNC: 23 MMOL/L (ref 21–28)
HCT VFR BLD AUTO: 44.9 % (ref 40–53)
HGB BLD-MCNC: 15.7 G/DL (ref 13.3–17.7)
HOLD SPECIMEN: NORMAL
IMM GRANULOCYTES # BLD: 0 10E3/UL
IMM GRANULOCYTES NFR BLD: 1 %
LYMPHOCYTES # BLD AUTO: 0.4 10E3/UL (ref 0.8–5.3)
LYMPHOCYTES NFR BLD AUTO: 10 %
MCH RBC QN AUTO: 29.2 PG (ref 26.5–33)
MCHC RBC AUTO-ENTMCNC: 35 G/DL (ref 31.5–36.5)
MCV RBC AUTO: 84 FL (ref 78–100)
MONOCYTES # BLD AUTO: 0.2 10E3/UL (ref 0–1.3)
MONOCYTES NFR BLD AUTO: 5 %
NEUTROPHILS # BLD AUTO: 3.5 10E3/UL (ref 1.6–8.3)
NEUTROPHILS NFR BLD AUTO: 84 %
NRBC # BLD AUTO: 0 10E3/UL
NRBC BLD AUTO-RTO: 0 /100
O2/TOTAL GAS SETTING VFR VENT: 34 %
PCO2 BLDV: 34 MM HG (ref 40–50)
PH BLDV: 7.43 [PH] (ref 7.32–7.43)
PLATELET # BLD AUTO: 135 10E3/UL (ref 150–450)
PO2 BLDV: 43 MM HG (ref 25–47)
POTASSIUM BLD-SCNC: 4.4 MMOL/L (ref 3.4–5.3)
RBC # BLD AUTO: 5.37 10E6/UL (ref 4.4–5.9)
SODIUM SERPL-SCNC: 133 MMOL/L (ref 133–144)
SPECIMEN EXPIRATION DATE: NORMAL
TROPONIN I SERPL HS-MCNC: 6 NG/L
WBC # BLD AUTO: 4.2 10E3/UL (ref 4–11)

## 2021-12-22 PROCEDURE — 85014 HEMATOCRIT: CPT | Performed by: FAMILY MEDICINE

## 2021-12-22 PROCEDURE — XW043E5 INTRODUCTION OF REMDESIVIR ANTI-INFECTIVE INTO CENTRAL VEIN, PERCUTANEOUS APPROACH, NEW TECHNOLOGY GROUP 5: ICD-10-PCS | Performed by: NURSE PRACTITIONER

## 2021-12-22 PROCEDURE — 36415 COLL VENOUS BLD VENIPUNCTURE: CPT | Performed by: INTERNAL MEDICINE

## 2021-12-22 PROCEDURE — 250N000012 HC RX MED GY IP 250 OP 636 PS 637: Performed by: INTERNAL MEDICINE

## 2021-12-22 PROCEDURE — 36415 COLL VENOUS BLD VENIPUNCTURE: CPT | Performed by: FAMILY MEDICINE

## 2021-12-22 PROCEDURE — 86901 BLOOD TYPING SEROLOGIC RH(D): CPT | Performed by: INTERNAL MEDICINE

## 2021-12-22 PROCEDURE — 250N000013 HC RX MED GY IP 250 OP 250 PS 637: Performed by: FAMILY MEDICINE

## 2021-12-22 PROCEDURE — 82803 BLOOD GASES ANY COMBINATION: CPT | Performed by: FAMILY MEDICINE

## 2021-12-22 PROCEDURE — 99285 EMERGENCY DEPT VISIT HI MDM: CPT | Performed by: FAMILY MEDICINE

## 2021-12-22 PROCEDURE — 99207 PR CDG-HISTORY COMPONENT: MEETS DETAILED - DOWN CODED LACK OF PFSH: CPT | Performed by: INTERNAL MEDICINE

## 2021-12-22 PROCEDURE — 99221 1ST HOSP IP/OBS SF/LOW 40: CPT | Mod: AI | Performed by: INTERNAL MEDICINE

## 2021-12-22 PROCEDURE — 83036 HEMOGLOBIN GLYCOSYLATED A1C: CPT | Performed by: INTERNAL MEDICINE

## 2021-12-22 PROCEDURE — 94640 AIRWAY INHALATION TREATMENT: CPT | Performed by: FAMILY MEDICINE

## 2021-12-22 PROCEDURE — 99285 EMERGENCY DEPT VISIT HI MDM: CPT | Mod: 25 | Performed by: FAMILY MEDICINE

## 2021-12-22 PROCEDURE — 250N000009 HC RX 250: Performed by: INTERNAL MEDICINE

## 2021-12-22 PROCEDURE — XW043H5 INTRODUCTION OF TOCILIZUMAB INTO CENTRAL VEIN, PERCUTANEOUS APPROACH, NEW TECHNOLOGY GROUP 5: ICD-10-PCS | Performed by: NURSE PRACTITIONER

## 2021-12-22 PROCEDURE — 120N000001 HC R&B MED SURG/OB

## 2021-12-22 PROCEDURE — 82310 ASSAY OF CALCIUM: CPT | Performed by: FAMILY MEDICINE

## 2021-12-22 PROCEDURE — 84484 ASSAY OF TROPONIN QUANT: CPT | Performed by: FAMILY MEDICINE

## 2021-12-22 PROCEDURE — 86140 C-REACTIVE PROTEIN: CPT | Performed by: INTERNAL MEDICINE

## 2021-12-22 PROCEDURE — 3E0433Z INTRODUCTION OF ANTI-INFLAMMATORY INTO CENTRAL VEIN, PERCUTANEOUS APPROACH: ICD-10-PCS | Performed by: NURSE PRACTITIONER

## 2021-12-22 PROCEDURE — 96361 HYDRATE IV INFUSION ADD-ON: CPT | Performed by: FAMILY MEDICINE

## 2021-12-22 PROCEDURE — 258N000003 HC RX IP 258 OP 636: Performed by: INTERNAL MEDICINE

## 2021-12-22 PROCEDURE — 85379 FIBRIN DEGRADATION QUANT: CPT | Performed by: INTERNAL MEDICINE

## 2021-12-22 PROCEDURE — 93005 ELECTROCARDIOGRAM TRACING: CPT | Performed by: FAMILY MEDICINE

## 2021-12-22 PROCEDURE — 96372 THER/PROPH/DIAG INJ SC/IM: CPT | Performed by: INTERNAL MEDICINE

## 2021-12-22 PROCEDURE — 96360 HYDRATION IV INFUSION INIT: CPT | Performed by: FAMILY MEDICINE

## 2021-12-22 PROCEDURE — 250N000011 HC RX IP 250 OP 636: Performed by: INTERNAL MEDICINE

## 2021-12-22 PROCEDURE — 258N000003 HC RX IP 258 OP 636: Performed by: FAMILY MEDICINE

## 2021-12-22 RX ORDER — NICOTINE POLACRILEX 4 MG
15-30 LOZENGE BUCCAL
Status: DISCONTINUED | OUTPATIENT
Start: 2021-12-22 | End: 2021-12-26 | Stop reason: HOSPADM

## 2021-12-22 RX ORDER — DEXAMETHASONE SODIUM PHOSPHATE 10 MG/ML
6 INJECTION, SOLUTION INTRAMUSCULAR; INTRAVENOUS EVERY 24 HOURS
Status: DISCONTINUED | OUTPATIENT
Start: 2021-12-22 | End: 2021-12-26 | Stop reason: HOSPADM

## 2021-12-22 RX ORDER — SODIUM CHLORIDE 9 MG/ML
INJECTION, SOLUTION INTRAVENOUS CONTINUOUS
Status: DISCONTINUED | OUTPATIENT
Start: 2021-12-22 | End: 2021-12-23

## 2021-12-22 RX ORDER — BLOOD SUGAR DIAGNOSTIC
1 STRIP MISCELLANEOUS 3 TIMES DAILY
COMMUNITY
Start: 2020-09-24

## 2021-12-22 RX ORDER — ATORVASTATIN CALCIUM 40 MG/1
1 TABLET, FILM COATED ORAL AT BEDTIME
COMMUNITY
Start: 2021-10-19 | End: 2022-10-19

## 2021-12-22 RX ORDER — CETIRIZINE HYDROCHLORIDE 10 MG/1
10 TABLET ORAL DAILY PRN
COMMUNITY

## 2021-12-22 RX ORDER — FAMOTIDINE 20 MG/1
20 TABLET, FILM COATED ORAL 2 TIMES DAILY
COMMUNITY
Start: 2021-10-19 | End: 2022-10-19

## 2021-12-22 RX ORDER — METFORMIN HCL 500 MG
2000 TABLET, EXTENDED RELEASE 24 HR ORAL
COMMUNITY
Start: 2021-10-19 | End: 2022-10-19

## 2021-12-22 RX ORDER — DEXTROSE MONOHYDRATE 25 G/50ML
25-50 INJECTION, SOLUTION INTRAVENOUS
Status: DISCONTINUED | OUTPATIENT
Start: 2021-12-22 | End: 2021-12-26 | Stop reason: HOSPADM

## 2021-12-22 RX ORDER — ALBUTEROL SULFATE 90 UG/1
2 AEROSOL, METERED RESPIRATORY (INHALATION) EVERY 4 HOURS PRN
Status: DISCONTINUED | OUTPATIENT
Start: 2021-12-22 | End: 2021-12-26 | Stop reason: HOSPADM

## 2021-12-22 RX ORDER — IBUPROFEN 200 MG
400-800 TABLET ORAL EVERY 8 HOURS PRN
COMMUNITY

## 2021-12-22 RX ORDER — GABAPENTIN 300 MG/1
CAPSULE ORAL
COMMUNITY
Start: 2021-12-07

## 2021-12-22 RX ORDER — ALBUTEROL SULFATE 90 UG/1
2 AEROSOL, METERED RESPIRATORY (INHALATION)
Status: DISCONTINUED | OUTPATIENT
Start: 2021-12-22 | End: 2021-12-26 | Stop reason: HOSPADM

## 2021-12-22 RX ADMIN — ALBUTEROL SULFATE 2 PUFF: 90 AEROSOL, METERED RESPIRATORY (INHALATION) at 22:43

## 2021-12-22 RX ADMIN — SODIUM CHLORIDE 500 ML: 9 INJECTION, SOLUTION INTRAVENOUS at 14:33

## 2021-12-22 RX ADMIN — Medication 100 MG: at 19:56

## 2021-12-22 RX ADMIN — INSULIN ASPART 5 UNITS: 100 INJECTION, SOLUTION INTRAVENOUS; SUBCUTANEOUS at 18:48

## 2021-12-22 RX ADMIN — SODIUM CHLORIDE 50 ML: 9 INJECTION, SOLUTION INTRAVENOUS at 20:49

## 2021-12-22 RX ADMIN — SODIUM CHLORIDE 125 ML: 9 INJECTION, SOLUTION INTRAVENOUS at 15:49

## 2021-12-22 RX ADMIN — SODIUM CHLORIDE: 9 INJECTION, SOLUTION INTRAVENOUS at 21:02

## 2021-12-22 RX ADMIN — INSULIN GLARGINE 15 UNITS: 100 INJECTION, SOLUTION SUBCUTANEOUS at 20:47

## 2021-12-22 RX ADMIN — ALBUTEROL SULFATE 2 PUFF: 90 AEROSOL, METERED RESPIRATORY (INHALATION) at 14:33

## 2021-12-22 RX ADMIN — DEXAMETHASONE SODIUM PHOSPHATE 6 MG: 10 INJECTION INTRAMUSCULAR; INTRAVENOUS at 20:46

## 2021-12-22 RX ADMIN — ENOXAPARIN SODIUM 40 MG: 40 INJECTION SUBCUTANEOUS at 20:42

## 2021-12-22 ASSESSMENT — ACTIVITIES OF DAILY LIVING (ADL)
ADLS_ACUITY_SCORE: 6
ADLS_ACUITY_SCORE: 6
ADLS_ACUITY_SCORE: 8
ADLS_ACUITY_SCORE: 6
ADLS_ACUITY_SCORE: 8
ADLS_ACUITY_SCORE: 6
ADLS_ACUITY_SCORE: 6
ADLS_ACUITY_SCORE: 8

## 2021-12-22 NOTE — ED TRIAGE NOTES
Pt is here because he is having more shortness of breath and low oxygen levels at home even on oxygen at 3L, he did not come with his home oxygen he was sent home with

## 2021-12-22 NOTE — LETTER
Transition Communication Hand-off for Care Transitions to Next Level of Care Provider    Name: Jaquan Schwab  : 1975  MRN #: [id number]  Primary Care Provider: Nathalie Lott     Primary Clinic: [facility] [address] [facility] [address]     Reason for Hospitalization:  Hyperglycemia [R73.9]  Acute respiratory failure with hypoxia (H) [J96.01]  Type 2 diabetes mellitus without complication, without long-term current use of insulin (H) [E11.9]  Pneumonia due to 2019 novel coronavirus [U07.1, J12.82]  Admit Date/Time: 2021  1:43 PM  Discharge Date: 21    Payor Source: Payor: BCBS / Plan: BCBS OUT OF STATE / Product Type: Indemnity /     Readmission Assessment Measure (EDNA) Risk Score/category: Low         Reason for Communication Hand-off Referral: Multiple providers/specialties    Discharge Plan: Home with [facility] COVID 19 Home Monitoring Program     Concern for non-adherence with plan of care:   Y/N : No    Discharge Needs Assessment:  Needs      Most Recent Value   Equipment Currently Used at Home none   # of Referrals Placed by [facility] External Care Coordination  [[facility] Covid 19 Home Monitoring Program]        Follow-up plan:  Needs PCP follow up appt for Hospital Follow Up    Any outstanding tests or procedures:        Referrals     Future Labs/Procedures    Care Coordination Referral     Process Instructions:    Services are provided by a Care Coordinator for people with complex needs such as: medical, social, or financial troubles.  The Care Coordinator works with the patient and their Primary Care Provider to determine health goals, obtain resources, achieve outcomes, and develop care plans that help coordinate the patient's care.     [facility] Referral     Process Instructions:    [employer] is not currently enrolling patients needing an , or patients under the age of 18.    Comments:    [employer] Enrollment:  After you get back home, we'd like to  check on you through a program called [employer]. Look for a text or email inviting you to activate your free account (unless you're already active in [facility]). Learn more at [email or web address]. If you haven't received your invitation to sign up, and it's been more than 24 hours since we saw you, please call [phone].            Supplies     Future Labs/Procedures    Oxygen Adult/Peds     Process Instructions:    By signing this order, the Authorizing Provider is attesting that they have completed a face-to-face evaluation on the patient to determine their need for this equipment in the last 60 days. A new face-to-face evaluation is required each time  A new prescription for one of the specified items is ordered.   If an additional provider completed the evaluation, please indicate their name below.     **As of 2018, an order requisition and face sheet will print for all DME orders. Please give printed order and face sheet to patient if not obtaining product from [facility] DME closet.     Comments:    Oxygen Documentation:   I certify that this patient, Jaquan Schwab has been under my care (or a nurse practitioner or physican's assistant working with me). This is the face-to-face encounter for oxygen medical necessity.      Jaquan Schwab is now in a chronic stable state and continues to require supplemental oxygen. Patient has continued oxygen desaturation due to COVID-19.    Alternative treatment(s) tried or considered and deemed clinically infective for treatment of COVID-19 include inhalers.  If portability is ordered, is the patient mobile within the home? yes    **Patients who qualify for home O2 coverage under the CMS guidelines require ABG tests or O2 sat readings obtained closest to, but no earlier than 2 days prior to the discharge, as evidence of the need for home oxygen therapy. Testing must be performed while patient is in the chronic stable state. See notes for O2 sats.**         PRISCA Jennings  [facility] [phone]/ [facility] [phone]  Care Management    AVS/Discharge Summary is the source of truth; this is a helpful guide for improved communication of patient story

## 2021-12-22 NOTE — ED PROVIDER NOTES
History     Chief Complaint   Patient presents with     Shortness of Breath     Hypoxia     HPI  Jaquan Schwab is a 46 year old male with prior medical history of obesity, adult-onset diabetes, hyperlipidemia who presents to the emergency department with a 9 to 10-day history of fever chills body aches cough congestion shortness of breath and progressive weakness.  Patient was seen in the emergency department yesterday and had a thorough work-up which revealed he had COVID-19 pneumonia.  This was confirmed on CT scan which showed no evidence of PE despite his elevated D-dimer.  All his labs were consistent with COVID-19 illness including leukopenia.  He was given Decadron and sent home on home oxygen which he could use up to 3 L.  Despite 3 L by nasal cannula at home his O2 sats were in the high 80s and he returns to the emergency department because of increasing shortness of breath and progressive severe weakness.  He is unvaccinated.  He is a non-smoker.    Allergies:  No Known Allergies    Problem List:    Patient Active Problem List    Diagnosis Date Noted     Acute respiratory failure with hypoxia (H) 12/22/2021     Priority: Medium     Pneumonia due to 2019 novel coronavirus 12/22/2021     Priority: Medium        Past Medical History:    Adult onset diabetes  Morbid obesity  History of back surgery  Hyperlipidemia.       Stress echo from 11/08/2021  Interpretation Summary   Normal resting heart rate and blood pressure with normal response to exercise   Low normal exercise capacity for age   Normal resting ECG, no ischemic changes seen with exercise   No chest pain with exercise   The resting LV function is mildly decreased, estimated EF 45 to 50% with mild   global hypokinesis. With exercise the LV size decreases and LV function   improves with good augmentation of all wall segment, ejection fraction   normalizes.   This is a normal stress echo indicating low probability of significant   exercise-induced  myocardial ischemia.   No hemodynamically significant valvular abnormalities on 2D or color flow   imaging.     Past Surgical History:    Past Surgical History:   Procedure Laterality Date     BACK SURGERY       ORTHOPEDIC SURGERY         Family History:    History reviewed. No pertinent family history.    Social History:  Marital Status:  Single [1]  Social History     Tobacco Use     Smoking status: Never Smoker     Smokeless tobacco: Never Used   Substance Use Topics     Alcohol use: Yes     Comment: rare     Drug use: Never        Medications:    atorvastatin (LIPITOR) 40 MG tablet  blood glucose (ACCU-CHEK GUIDE) test strip  cetirizine (ZYRTEC) 10 MG tablet  dexamethasone (DECADRON) 6 MG tablet  empagliflozin (JARDIANCE) 10 MG TABS tablet  famotidine (PEPCID) 20 MG tablet  gabapentin (NEURONTIN) 300 MG capsule  ibuprofen (ADVIL/MOTRIN) 200 MG tablet  metFORMIN (GLUCOPHAGE-XR) 500 MG 24 hr tablet  omeprazole (PRILOSEC) 20 MG DR capsule  tiZANidine (ZANAFLEX) 4 MG tablet          Review of Systems   All other systems reviewed and are negative.      Physical Exam   BP: 119/82  Pulse: 110  Temp: 99.5  F (37.5  C)  Resp: 20  Weight: 96.3 kg (212 lb 4.8 oz)  SpO2: (!) 88 %      Physical Exam  Constitutional:       Appearance: He is obese. He is ill-appearing.   HENT:      Head: Normocephalic and atraumatic.      Mouth/Throat:      Mouth: Mucous membranes are moist.      Pharynx: Oropharynx is clear.   Eyes:      Extraocular Movements: Extraocular movements intact.      Pupils: Pupils are equal, round, and reactive to light.   Cardiovascular:      Rate and Rhythm: Normal rate and regular rhythm.   Pulmonary:      Effort: Pulmonary effort is normal.      Breath sounds: Decreased breath sounds, wheezing and rhonchi present.      Comments: He does not take a deep inspiration I suspect because it makes him cough.  There is a little scattered rhonchi and end expiratory wheeze.  No prolonged expiratory phase.  No other  wheezing.  But very poor inspiratory effort.  Abdominal:      Palpations: Abdomen is soft.   Musculoskeletal:         General: Normal range of motion.      Cervical back: Normal range of motion and neck supple.   Skin:     General: Skin is warm and dry.      Capillary Refill: Capillary refill takes less than 2 seconds.   Neurological:      General: No focal deficit present.      Mental Status: He is alert.   Psychiatric:         Mood and Affect: Mood normal.         Behavior: Behavior normal.         ED Course                 Procedures              Critical Care time:  none               Results for orders placed or performed during the hospital encounter of 12/22/21 (from the past 24 hour(s))   CBC with platelets differential    Narrative    The following orders were created for panel order CBC with platelets differential.  Procedure                               Abnormality         Status                     ---------                               -----------         ------                     CBC with platelets and d...[[id number]]  Abnormal            Final result                 Please view results for these tests on the individual orders.   Basic metabolic panel   Result Value Ref Range    Sodium 133 133 - 144 mmol/L    Potassium 4.4 3.4 - 5.3 mmol/L    Chloride 103 94 - 109 mmol/L    Carbon Dioxide (CO2) 22 20 - 32 mmol/L    Anion Gap 8 3 - 14 mmol/L    Urea Nitrogen 17 7 - 30 mg/dL    Creatinine 0.60 (L) 0.66 - 1.25 mg/dL    Calcium 8.8 8.5 - 10.1 mg/dL    Glucose 316 (H) 70 - 99 mg/dL    GFR Estimate >90 >60 mL/min/1.73m2   CBC with platelets and differential   Result Value Ref Range    WBC Count 4.2 4.0 - 11.0 10e3/uL    RBC Count 5.37 4.40 - 5.90 10e6/uL    Hemoglobin 15.7 13.3 - 17.7 g/dL    Hematocrit 44.9 40.0 - 53.0 %    MCV 84 78 - 100 fL    MCH 29.2 26.5 - 33.0 pg    MCHC 35.0 31.5 - 36.5 g/dL    RDW 12.2 10.0 - 15.0 %    Platelet Count 135 (L) 150 - 450 10e3/uL    % Neutrophils 84 %    %  Lymphocytes 10 %    % Monocytes 5 %    % Eosinophils 0 %    % Basophils 0 %    % Immature Granulocytes 1 %    NRBCs per 100 WBC 0 <1 /100    Absolute Neutrophils 3.5 1.6 - 8.3 10e3/uL    Absolute Lymphocytes 0.4 (L) 0.8 - 5.3 10e3/uL    Absolute Monocytes 0.2 0.0 - 1.3 10e3/uL    Absolute Eosinophils 0.0 0.0 - 0.7 10e3/uL    Absolute Basophils 0.0 0.0 - 0.2 10e3/uL    Absolute Immature Granulocytes 0.0 <=0.4 10e3/uL    Absolute NRBCs 0.0 10e3/uL   Troponin I   Result Value Ref Range    Troponin I High Sensitivity 6 <79 ng/L   Blood gas venous   Result Value Ref Range    pH Venous 7.43 7.32 - 7.43    pCO2 Venous 34 (L) 40 - 50 mm Hg    pO2 Venous 43 25 - 47 mm Hg    Bicarbonate Venous 23 21 - 28 mmol/L    Base Excess/Deficit (+/-) -1.0 -7.7 - 1.9 mmol/L    FIO2 34        Medications   0.9% sodium chloride BOLUS (0 mLs Intravenous Stopped 12/22/21 1549)     Followed by   sodium chloride 0.9% infusion (125 mLs Intravenous New Bag 12/22/21 1549)   albuterol (PROVENTIL HFA/VENTOLIN HFA) inhaler (2 puffs Inhalation Given 12/22/21 1433)       Assessments & Plan (with Medical Decision Making)   [facility]--46-year-old obese non-smoking nonvaccinated male with a 9-day history of fever chills cough congestion shortness of breath body aches headaches who tested positive for COVID-19 illness yesterday.  Chest CT shows Covid pneumonia but no PEs.  All labs consistent with COVID-19 with no other abnormalities.  He was sent home on oxygen and had that turned up to 3 L but was still running sats below 90.  He has severe dyspnea with minimal exertion such as going to the bathroom.  He is requiring 2 to 3 L to keep his sats in the low 90s here.  He was started on steroids yesterday and had a dose of Decadron yesterday and today.  I have added albuterol metered-dose inhaler.  Will discuss with the hospitalist.  Also reported the patient's blood sugar was 300.  He is on Decadron and has adult onset diabetes.  He will need this better  controlled.  Patient requires admission to the hospital for ongoing oxygen supplementation and close observation.  Case discussed with  who accepts care and transfer of the patient.  Patient admitted in stable condition.  I have reviewed the nursing notes.    I have reviewed the findings, diagnosis, plan and need for follow up with the patient.          New Prescriptions    No medications on file       Final diagnoses:   Pneumonia due to 2019 novel coronavirus   Acute respiratory failure with hypoxia (H)   Type 2 diabetes mellitus without complication, without long-term current use of insulin (H)   Hyperglycemia       12/22/2021   [facility] EMERGENCY DEPT     Mayra, Leo MOHAN MD  12/22/21 5588       Mayra, Leo MOHAN MD  12/22/21 6588

## 2021-12-22 NOTE — H&P
[facility]    History and Physical - Hospitalist Service       Date of Admission:  12/22/2021    Assessment & Plan             Jaquan Schwab is a 46 year old male admitted on 12/22/2021. He has been admitted with  symptoms including sob, cough,  myalgia, nausea, and fever - that started on Dec 14th.  Negative at home COVID test 12/20. Symptoms have been progressively worsening and patient came to the ER yesterday, tested positive for COVID and received remdesivir 1 dose and dexamethasone 1 dose yesterday and was discharged on oxygen 3 liters to home. He was found to have acute hypoxic respiratory failure and COVID pneumonia. Manage with  iv remdesivir and dexamethasone. Will monitor respiratory status closely        A/p :           Acute hypoxic respiratory failure : on 3 liters of oxygen      COVID pneumonia : manage with dexamethasone - started 12/21, remdesivir 12/21.      HLD : on statin      DM2 : on jardiance 10 mg daily, metformin 2 gm daily - at home. On sliding scale insulin + lantus.      GERD : on PPI, pepcid      Neuropathy : on gabapentin 300 mg in am and 600 mg at bedtime        Diet: Low Saturated Fat Diet    DVT Prophylaxis: Enoxaparin (Lovenox) SQ  [place] Catheter: Not present  Central Lines: None  Code Status:   full     Clinically Significant Risk Factors Present on Admission                # Obesity: last Body mass index is 31.35 kg/m .      Disposition Plan   Expected Discharge: 3-4 days  Anticipated discharge location: home  Delays:    On oxygen       The patient's care was discussed with the Bedside Nurse and Patient.    Heladio Eddy MD  [facility]  Securely message with the Vocera Web Console (learn more here)  Text page via [employer] Paging/Directory        ______________________________________________________________________    Chief Complaint   sob    History is obtained from the patient    History of Present Illness      Jaquan Schwab is a 46 year old male admitted on 2021. He has been admitted with  symptoms including sob, cough,  myalgia, nausea, and fever - that started on Dec 14th.  Negative at home COVID test . Symptoms have been progressively worsening and patient came to the ER yesterday, tested positive for COVID and received remdesivir 1 dose and dexamethasone 1 dose yesterday and was discharged on oxygen 3 liters to home. He was found to have acute hypoxic respiratory failure and COVID pneumonia. Manage with  iv remdesivir and dexamethasone. Will monitor respiratory status closely        Review of Systems      No urinary symptoms  No neuro symptoms    Past Medical History    I have reviewed this patient's medical history and updated it with pertinent information if needed.   History reviewed. No pertinent past medical history.    Past Surgical History   I have reviewed this patient's surgical history and updated it with pertinent information if needed.  Past Surgical History:   Procedure Laterality Date     BACK SURGERY       ORTHOPEDIC SURGERY         Social History   I have reviewed this patient's social history and updated it with pertinent information if needed.  Social History     Tobacco Use     Smoking status: Never Smoker     Smokeless tobacco: Never Used   Substance Use Topics     Alcohol use: Yes     Comment: rare     Drug use: Never     Lives in [place] in a house  Significant other  No children  Denies smoking, socially alcohol use, no drugs  Working - [employer]    Family History     Non contributory    Prior to Admission Medications   Prior to Admission Medications   Prescriptions Last Dose Informant Patient Reported? Taking?   atorvastatin (LIPITOR) 40 MG tablet 2021 at hs Self Yes Yes   Sig: Take 1 tablet by mouth At Bedtime    blood glucose (ACCU-CHEK GUIDE) test strip 2021 at Unknown time Self Yes Yes   Si strip by In Vitro route 3 times daily   cetirizine  (ZYRTEC) 10 MG tablet More than a month at Unknown time Self Yes Yes   Sig: Take 10 mg by mouth daily as needed   dexamethasone (DECADRON) 6 MG tablet 12/22/2021 at am Self No Yes   Sig: Take 1 tablet (6 mg) by mouth daily for 5 days   empagliflozin (JARDIANCE) 10 MG TABS tablet 12/21/2021 at am Self Yes Yes   Sig: Take 10 mg by mouth daily   famotidine (PEPCID) 20 MG tablet 12/22/2021 at am Self Yes Yes   Sig: Take 20 mg by mouth 2 times daily   gabapentin (NEURONTIN) 300 MG capsule 12/21/2021 at hs Self Yes Yes   Sig: TAKE ONE CAPSULE BY MOUTH EVERY MORNING AND TAKE TWO CAPSULES BY MOUTH AT BEDTIME   ibuprofen (ADVIL/MOTRIN) 200 MG tablet 12/22/2021 at 1200 Self Yes Yes   Sig: Take 400-800 mg by mouth every 8 hours as needed   metFORMIN (GLUCOPHAGE-XR) 500 MG 24 hr tablet 12/21/2021 at am Self Yes Yes   Sig: Take 2,000 mg by mouth daily (with breakfast)   omeprazole (PRILOSEC) 20 MG DR capsule 12/22/2021 at am Self Yes Yes   Sig: Take 20 mg by mouth daily   tiZANidine (ZANAFLEX) 4 MG tablet 12/21/2021 at hs Self Yes Yes   Sig: Take 4 mg by mouth At Bedtime      Facility-Administered Medications: None     Allergies   No Known Allergies    Physical Exam   Vital Signs: Temp: 99.5  F (37.5  C) Temp src: Oral BP: 130/86 Pulse: 75   Resp: 23 SpO2: 93 % O2 Device: Nasal cannula Oxygen Delivery: 3 LPM  Weight: 212 lbs 4.8 oz       GENERAL: The patient is not in any acute distressed. Awake and alert.  HEENT: Nonicteric sclerae, PERRLA, EOMI. Oropharynx clear. Moist mucous membranes. Conjunctivae appear well perfused.  HEART: Regular rate and rhythm without murmurs.  LUNGS: Clear to auscultation bilaterally. No wheezing or crackles.  ABDOMEN: Soft, positive bowel sounds, nontender.  SKIN: No rash, no excessive bruising, petechiae, or purpura.  EXTREMITIES : no rashes, no swelling in legs.  NEUROLOGIC: conscious and oriented, follows commands, no obvious focal deficits.  ROS: All other systems negative       Data   Data  reviewed today: I reviewed all medications, new labs and imaging results over the last 24 hours. I personally reviewed no images or EKG's today.    Recent Labs   Lab 12/22/21  1846 12/22/21  1432 12/21/21  0623   WBC  --  4.2 1.9*   HGB  --  15.7 16.2   MCV  --  84 84   PLT  --  135* 108*   NA  --  133 133   POTASSIUM  --  4.4 4.2   CHLORIDE  --  103 104   CO2  --  22 20   BUN  --  17 11   CR  --  0.60* 0.58*   ANIONGAP  --  8 9   JAZMYNE  --  8.8 8.6   * 316* 317*   ALBUMIN  --   --  3.1*   PROTTOTAL  --   --  7.1   BILITOTAL  --   --  0.6   ALKPHOS  --   --  95   ALT  --   --  33   AST  --   --  21

## 2021-12-23 ENCOUNTER — APPOINTMENT (OUTPATIENT)
Dept: GENERAL RADIOLOGY | Facility: CLINIC | Age: 46
End: 2021-12-23
Attending: INTERNAL MEDICINE
Payer: COMMERCIAL

## 2021-12-23 LAB
ANION GAP SERPL CALCULATED.3IONS-SCNC: 6 MMOL/L (ref 3–14)
BASE EXCESS BLDA CALC-SCNC: -6 MMOL/L (ref -9–1.8)
BUN SERPL-MCNC: 17 MG/DL (ref 7–30)
CALCIUM SERPL-MCNC: 8.8 MG/DL (ref 8.5–10.1)
CHLORIDE BLD-SCNC: 108 MMOL/L (ref 94–109)
CO2 SERPL-SCNC: 24 MMOL/L (ref 20–32)
CREAT SERPL-MCNC: 0.58 MG/DL (ref 0.66–1.25)
CRP SERPL-MCNC: 52.8 MG/L (ref 0–8)
D DIMER PPP FEU-MCNC: 0.39 UG/ML FEU (ref 0–0.5)
ERYTHROCYTE [DISTWIDTH] IN BLOOD BY AUTOMATED COUNT: 12 % (ref 10–15)
FIBRINOGEN PPP-MCNC: 522 MG/DL (ref 170–490)
GFR SERPL CREATININE-BSD FRML MDRD: >90 ML/MIN/1.73M2
GLUCOSE BLD-MCNC: 255 MG/DL (ref 70–99)
GLUCOSE BLDC GLUCOMTR-MCNC: 224 MG/DL (ref 70–99)
GLUCOSE BLDC GLUCOMTR-MCNC: 250 MG/DL (ref 70–99)
GLUCOSE BLDC GLUCOMTR-MCNC: 262 MG/DL (ref 70–99)
GLUCOSE BLDC GLUCOMTR-MCNC: 277 MG/DL (ref 70–99)
GLUCOSE BLDC GLUCOMTR-MCNC: 307 MG/DL (ref 70–99)
HCO3 BLD-SCNC: 18 MMOL/L (ref 21–28)
HCT VFR BLD AUTO: 43.7 % (ref 40–53)
HGB BLD-MCNC: 14.8 G/DL (ref 13.3–17.7)
MCH RBC QN AUTO: 29 PG (ref 26.5–33)
MCHC RBC AUTO-ENTMCNC: 33.9 G/DL (ref 31.5–36.5)
MCV RBC AUTO: 86 FL (ref 78–100)
O2/TOTAL GAS SETTING VFR VENT: 70 %
PCO2 BLD: 26 MM HG (ref 35–45)
PH BLD: 7.45 [PH] (ref 7.35–7.45)
PLATELET # BLD AUTO: 144 10E3/UL (ref 150–450)
PO2 BLD: 84 MM HG (ref 80–105)
POTASSIUM BLD-SCNC: 4.5 MMOL/L (ref 3.4–5.3)
RBC # BLD AUTO: 5.11 10E6/UL (ref 4.4–5.9)
SODIUM SERPL-SCNC: 138 MMOL/L (ref 133–144)
WBC # BLD AUTO: 3.2 10E3/UL (ref 4–11)

## 2021-12-23 PROCEDURE — 250N000013 HC RX MED GY IP 250 OP 250 PS 637: Performed by: INTERNAL MEDICINE

## 2021-12-23 PROCEDURE — 82803 BLOOD GASES ANY COMBINATION: CPT | Performed by: INTERNAL MEDICINE

## 2021-12-23 PROCEDURE — 999N000156 HC STATISTIC RCP CONSULT EA 30 MIN

## 2021-12-23 PROCEDURE — 250N000011 HC RX IP 250 OP 636: Performed by: INTERNAL MEDICINE

## 2021-12-23 PROCEDURE — 85384 FIBRINOGEN ACTIVITY: CPT | Performed by: INTERNAL MEDICINE

## 2021-12-23 PROCEDURE — 36600 WITHDRAWAL OF ARTERIAL BLOOD: CPT

## 2021-12-23 PROCEDURE — 999N000215 HC STATISTIC HFNC ADULT NON-CPAP

## 2021-12-23 PROCEDURE — 80048 BASIC METABOLIC PNL TOTAL CA: CPT | Performed by: INTERNAL MEDICINE

## 2021-12-23 PROCEDURE — 94660 CPAP INITIATION&MGMT: CPT

## 2021-12-23 PROCEDURE — 86140 C-REACTIVE PROTEIN: CPT | Performed by: INTERNAL MEDICINE

## 2021-12-23 PROCEDURE — 999N000157 HC STATISTIC RCP TIME EA 10 MIN

## 2021-12-23 PROCEDURE — 85014 HEMATOCRIT: CPT | Performed by: INTERNAL MEDICINE

## 2021-12-23 PROCEDURE — 258N000003 HC RX IP 258 OP 636: Performed by: INTERNAL MEDICINE

## 2021-12-23 PROCEDURE — 258N000003 HC RX IP 258 OP 636: Performed by: FAMILY MEDICINE

## 2021-12-23 PROCEDURE — 36415 COLL VENOUS BLD VENIPUNCTURE: CPT | Performed by: INTERNAL MEDICINE

## 2021-12-23 PROCEDURE — 120N000001 HC R&B MED SURG/OB

## 2021-12-23 PROCEDURE — 250N000009 HC RX 250: Performed by: INTERNAL MEDICINE

## 2021-12-23 PROCEDURE — 99233 SBSQ HOSP IP/OBS HIGH 50: CPT | Performed by: INTERNAL MEDICINE

## 2021-12-23 PROCEDURE — 85379 FIBRIN DEGRADATION QUANT: CPT | Performed by: INTERNAL MEDICINE

## 2021-12-23 PROCEDURE — 71045 X-RAY EXAM CHEST 1 VIEW: CPT

## 2021-12-23 RX ORDER — ALPRAZOLAM 0.25 MG
0.25 TABLET ORAL 4 TIMES DAILY PRN
Status: DISCONTINUED | OUTPATIENT
Start: 2021-12-23 | End: 2021-12-26 | Stop reason: HOSPADM

## 2021-12-23 RX ORDER — ACETAMINOPHEN 325 MG/1
650 TABLET ORAL EVERY 4 HOURS PRN
Status: DISCONTINUED | OUTPATIENT
Start: 2021-12-23 | End: 2021-12-26 | Stop reason: HOSPADM

## 2021-12-23 RX ADMIN — DEXAMETHASONE SODIUM PHOSPHATE 6 MG: 10 INJECTION INTRAMUSCULAR; INTRAVENOUS at 20:03

## 2021-12-23 RX ADMIN — SODIUM CHLORIDE 50 ML: 9 INJECTION, SOLUTION INTRAVENOUS at 18:30

## 2021-12-23 RX ADMIN — ALBUTEROL SULFATE 2 PUFF: 90 AEROSOL, METERED RESPIRATORY (INHALATION) at 21:07

## 2021-12-23 RX ADMIN — ALBUTEROL SULFATE 2 PUFF: 90 AEROSOL, METERED RESPIRATORY (INHALATION) at 17:09

## 2021-12-23 RX ADMIN — TOCILIZUMAB 800 MG: 20 INJECTION, SOLUTION, CONCENTRATE INTRAVENOUS at 14:19

## 2021-12-23 RX ADMIN — ALBUTEROL SULFATE 2 PUFF: 90 AEROSOL, METERED RESPIRATORY (INHALATION) at 09:54

## 2021-12-23 RX ADMIN — INSULIN ASPART 2 UNITS: 100 INJECTION, SOLUTION INTRAVENOUS; SUBCUTANEOUS at 17:05

## 2021-12-23 RX ADMIN — Medication 100 MG: at 17:05

## 2021-12-23 RX ADMIN — INSULIN ASPART 3 UNITS: 100 INJECTION, SOLUTION INTRAVENOUS; SUBCUTANEOUS at 08:15

## 2021-12-23 RX ADMIN — ALBUTEROL SULFATE 2 PUFF: 90 AEROSOL, METERED RESPIRATORY (INHALATION) at 14:19

## 2021-12-23 RX ADMIN — ACETAMINOPHEN 650 MG: 325 TABLET, FILM COATED ORAL at 03:12

## 2021-12-23 RX ADMIN — SODIUM CHLORIDE: 9 INJECTION, SOLUTION INTRAVENOUS at 06:08

## 2021-12-23 RX ADMIN — ENOXAPARIN SODIUM 40 MG: 40 INJECTION SUBCUTANEOUS at 20:05

## 2021-12-23 RX ADMIN — ALBUTEROL SULFATE 2 PUFF: 90 AEROSOL, METERED RESPIRATORY (INHALATION) at 06:07

## 2021-12-23 RX ADMIN — ENOXAPARIN SODIUM 40 MG: 40 INJECTION SUBCUTANEOUS at 09:55

## 2021-12-23 RX ADMIN — ALPRAZOLAM 0.25 MG: 0.25 TABLET ORAL at 21:15

## 2021-12-23 RX ADMIN — INSULIN ASPART 3 UNITS: 100 INJECTION, SOLUTION INTRAVENOUS; SUBCUTANEOUS at 12:13

## 2021-12-23 RX ADMIN — ALPRAZOLAM 0.25 MG: 0.25 TABLET ORAL at 14:19

## 2021-12-23 ASSESSMENT — ACTIVITIES OF DAILY LIVING (ADL)
ADLS_ACUITY_SCORE: 6
ADLS_ACUITY_SCORE: 8
ADLS_ACUITY_SCORE: 6

## 2021-12-23 NOTE — PLAN OF CARE
"S-(situation): End of shift note    B-(background): ARF, Covid    A-(assessment): Vital signs stable./67 (BP Location: Left arm)   Pulse 75   Temp 99.5  F (37.5  C) (Oral)   Resp 24   Ht 1.753 m (5' 9\")   Wt 96.3 kg (212 lb 4.8 oz)   SpO2 (!) 89%   BMI 31.35 kg/m  .  Lung sounds diminished, small breaths. Infrequent cough.  IS in use. Pt started shift off on 5 L NC and progressed to 10L. MD notified and pt moved to HFNC starting off at 60% FIO2 and 40 LPM.  Increased to 80% FIO2 and 50 LPM through night.  A&O x4.  Pt complaining of body aches. Tylenol given for temp and pain control.  Tele SR. Bowel sounds normoactive. Bedrest with bathroom privileges.   Voiding adequately with urinal at bedside.  IV site asymptomatic.  Able to make needs known and uses call light appropriately.     R-(recommendations): Continue to monitor per care plan.    "

## 2021-12-23 NOTE — PROGRESS NOTES
S-(situation): Patient arrives to room 257 via cart from ED    B-(background): Covid positive    A-(assessment): Pt is A&O.  VSS.  Temp 99,8.  Lung sounds diminished, can't take a deep breath.  Sats 92 on 4 liters N/C.  Is diabetic, blood sugars done and covered with Insulin.      R-(recommendations): Orders reviewed with pt.. Will monitor patient per MD orders.     Inpatient nursing criteria listed below were met:    Health care directives status obtained and documented: Yes  SCD's Documented: No  Skin issues/needs documented:NA  Isolation addressed and Signage used: Yes  Fall Prevention: Care plan updated No Education given and documented No  Care Plan initiated and Co-Morbidities added: Yes  Education Assessment documented:Yes  Admission Education Documented: Yes  If present CAUTI/CLABI Education done: NA  New medication patient education completed and documented (Possible Side Effects of Common Medications handout): Yes  Allergies Reviewed: Yes  Admission Medication Reconciliation completed: Yes  Home medications if not able to send immediately home with family stored here: N/A  Reminder note placed in discharge instructions regarding home meds: No  Individualized care needs/preferences addressed and charted: YES

## 2021-12-23 NOTE — PROGRESS NOTES
12/23/21 1604   Mode: CPAP/ BiPAP/ AVAPS/ AVAPS AE   CPAP/BiPAP/ AVAPS/ AVAPS AE Mode CPAP;other (see comments)   CPAP/BiPAP/Settings   Oxygen (%) 50   O2 Flow Rate (L/min) 45   RT Device Skin Assessment   Oxygen Delivery Device High Flow Nasal Cannula   Site Appearance nares (inner) Clean and dry   Site Appearance of ears Clean and dry   Strap Tightness No Finger Allowance between skin and device strap   Skin Interventions Taken No adjustments needed   Respiratory WDL   Respiratory WDL X   Rhythm/Pattern, Respiratory assisted mechanically;dyspnea on exertion   Cough Frequency infrequent   Breath Sounds   Breath Sounds All Fields   All Lung Fields Breath Sounds diminished   LLL Breath Sounds crackles, coarse     Patient continues to require High flow nasal cannula for CPAP ventilatory support  Will wean oxygen for SpO2 and Flow for work of breathing as able
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
## 2021-12-23 NOTE — PLAN OF CARE
Major Events: Increased anxiety, PRN Xanax ordered.    Vitals: Stable, Oxygen saturation in low 90's  Oxygen Needs: HFNC 75% and 45 L  Pain: Denies    Neuro: Alert and oriented x4 Pulmonary: Lung sounds Diminished Cardiovascular: WNL, tachycardia with movement GI/: WNL, BM x1, urinal at bedside.    Infusion Status: Saline locked   PRN's given: PRN xanax x1  Protocols: None     Plan: Continue to monitor

## 2021-12-23 NOTE — PROGRESS NOTES
12/23/21 0900   Mode: CPAP/ BiPAP/ AVAPS/ AVAPS AE   CPAP/BiPAP/ AVAPS/ AVAPS AE Mode CPAP;other (see comments)   CPAP/BiPAP/Settings   $BIPAP/CPAP Therapy continuous   Oxygen (%) 70   O2 Flow Rate (L/min) 50   RT Device Skin Assessment   Oxygen Delivery Device High Flow Nasal Cannula   Site Appearance nares (inner) Clean and dry   Site Appearance of ears Clean and dry   Strap Tightness Finger Allowance between head and device strap   Skin Interventions Taken No adjustments needed   Respiratory WDL   Respiratory WDL X   Rhythm/Pattern, Respiratory dyspnea on exertion   Cough Frequency infrequent   Breath Sounds   Breath Sounds All Fields   All Lung Fields Breath Sounds diminished     Patient is requiring High flow nasal cannula for ventilatory support

## 2021-12-23 NOTE — PROGRESS NOTES
"CLINICAL NUTRITION SERVICES - ASSESSMENT NOTE     Nutrition Prescription    RECOMMENDATIONS FOR MDs/PROVIDERS TO ORDER:  None at this time     Malnutrition Status:    Unable to determine due to lack of filling all parameters needed for diagnosis     Recommendations already ordered by Registered Dietitian (RD):  1. Room Service     Future/Additional Recommendations:  Monitor PO intake, need for supplementation, weight trends      REASON FOR ASSESSMENT  Jaquan Schwab is a/an 46 year old male assessed by the dietitian for Admission Nutrition Risk Screen for positive    NUTRITION HISTORY  Per Chart Review:   -PMH obesity, adult-onset diabetes, hyperlipidemia who presents to the emergency department with a 9 to 10-day history of fever chills body aches cough congestion shortness of breath and progressive weakness. COVID+.  -Currently requiring HFNC 50 LPM 70%     -Per patient, denies any changes to eating/appetite prior to admission.     CURRENT NUTRITION ORDERS  Diet: Moderate Consistent Carb (60 g CHO per Meal) Diet   Intake/Tolerance: Limited intake recorded. 25% of breakfast recorded this morning. Per patient, states eating and appetite is \"good\". Patient wanting to be added to room service to select own meals.     LABS  Labs reviewed    MEDICATIONS  Medications reviewed    ANTHROPOMETRICS  Height: 175.3 cm (5' 9\")  Most Recent Weight: 96.3 kg (212 lb 4.8 oz)    IBW: 72.7 kg  BMI: Obesity Grade I BMI 30-34.9  Weight History:   -Noted a 8 kg (7.8%) weight loss in 2 months (severe)   Wt Readings from Last 10 Encounters:   12/22/21 96.3 kg (212 lb 4.8 oz)   12/21/21 95.7 kg (211 lb)     Per Care Everywhere:   10/19/21 104.3 kg     Dosing Weight: 79 kg    ASSESSED NUTRITION NEEDS  Estimated Energy Needs: 0465-1353 kcals/day (25 - 30 kcals/kg)  Justification: Maintenance  Estimated Protein Needs:  grams protein/day (1.2 - 1.5 grams of pro/kg)  Justification: Increased needs  Estimated Fluid Needs: 1 mL/kcal  "   Justification: Per provider pending fluid status    PHYSICAL FINDINGS  See malnutrition section below.    MALNUTRITION  % Intake: No decreased intake noted  % Weight Loss: > 7.5% in 3 months (severe)  Subcutaneous Fat Loss: Unable to assess - COVID+ precautions   Muscle Loss: Unable to assess - COVID+ precautions   Fluid Accumulation/Edema: None noted  Malnutrition Diagnosis: Unable to determine due to lack of filling all parameters needed for diagnosis     NUTRITION DIAGNOSIS  Predicted inadequate nutrient intake (protein/energy) related to Covid-19 and the potential for course of hospitalization to affect intake       INTERVENTIONS  Implementation  Nutrition Education: Introduced self and role of RD in patient care. Encouraged oral intake as tolerated. Explained the body's need for increase kcal and protein during acute illness.    Collaboration with other providers - IDT rounds   Room Service Appropriate      Goals  Patient to consume % of nutritionally adequate meal trays TID, or the equivalent with supplements/snacks.     Monitoring/Evaluation  Progress toward goals will be monitored and evaluated per protocol.    Dionte Landaverde RDN, BRENDA  Clinical Dietitian   Office: [phone]  [facility] Pager: [phone]

## 2021-12-24 LAB
ANION GAP SERPL CALCULATED.3IONS-SCNC: 7 MMOL/L (ref 3–14)
BUN SERPL-MCNC: 19 MG/DL (ref 7–30)
CALCIUM SERPL-MCNC: 9 MG/DL (ref 8.5–10.1)
CHLORIDE BLD-SCNC: 109 MMOL/L (ref 94–109)
CO2 SERPL-SCNC: 22 MMOL/L (ref 20–32)
CREAT SERPL-MCNC: 0.43 MG/DL (ref 0.66–1.25)
CRP SERPL-MCNC: 30.9 MG/L (ref 0–8)
D DIMER PPP FEU-MCNC: 0.27 UG/ML FEU (ref 0–0.5)
ERYTHROCYTE [DISTWIDTH] IN BLOOD BY AUTOMATED COUNT: 12.1 % (ref 10–15)
FIBRINOGEN PPP-MCNC: 540 MG/DL (ref 170–490)
GFR SERPL CREATININE-BSD FRML MDRD: >90 ML/MIN/1.73M2
GLUCOSE BLD-MCNC: 290 MG/DL (ref 70–99)
GLUCOSE BLDC GLUCOMTR-MCNC: 205 MG/DL (ref 70–99)
GLUCOSE BLDC GLUCOMTR-MCNC: 257 MG/DL (ref 70–99)
GLUCOSE BLDC GLUCOMTR-MCNC: 258 MG/DL (ref 70–99)
GLUCOSE BLDC GLUCOMTR-MCNC: 302 MG/DL (ref 70–99)
GLUCOSE BLDC GLUCOMTR-MCNC: 351 MG/DL (ref 70–99)
HCT VFR BLD AUTO: 45.1 % (ref 40–53)
HGB BLD-MCNC: 15.5 G/DL (ref 13.3–17.7)
MCH RBC QN AUTO: 29.2 PG (ref 26.5–33)
MCHC RBC AUTO-ENTMCNC: 34.4 G/DL (ref 31.5–36.5)
MCV RBC AUTO: 85 FL (ref 78–100)
PLATELET # BLD AUTO: 167 10E3/UL (ref 150–450)
POTASSIUM BLD-SCNC: 4.4 MMOL/L (ref 3.4–5.3)
RBC # BLD AUTO: 5.3 10E6/UL (ref 4.4–5.9)
SODIUM SERPL-SCNC: 138 MMOL/L (ref 133–144)
WBC # BLD AUTO: 3.8 10E3/UL (ref 4–11)

## 2021-12-24 PROCEDURE — 250N000011 HC RX IP 250 OP 636: Performed by: INTERNAL MEDICINE

## 2021-12-24 PROCEDURE — 86140 C-REACTIVE PROTEIN: CPT | Performed by: INTERNAL MEDICINE

## 2021-12-24 PROCEDURE — 99233 SBSQ HOSP IP/OBS HIGH 50: CPT | Performed by: INTERNAL MEDICINE

## 2021-12-24 PROCEDURE — 250N000009 HC RX 250: Performed by: INTERNAL MEDICINE

## 2021-12-24 PROCEDURE — 85379 FIBRIN DEGRADATION QUANT: CPT | Performed by: INTERNAL MEDICINE

## 2021-12-24 PROCEDURE — 80048 BASIC METABOLIC PNL TOTAL CA: CPT | Performed by: INTERNAL MEDICINE

## 2021-12-24 PROCEDURE — 36415 COLL VENOUS BLD VENIPUNCTURE: CPT | Performed by: INTERNAL MEDICINE

## 2021-12-24 PROCEDURE — 258N000003 HC RX IP 258 OP 636: Performed by: INTERNAL MEDICINE

## 2021-12-24 PROCEDURE — 250N000013 HC RX MED GY IP 250 OP 250 PS 637: Performed by: INTERNAL MEDICINE

## 2021-12-24 PROCEDURE — 85027 COMPLETE CBC AUTOMATED: CPT | Performed by: INTERNAL MEDICINE

## 2021-12-24 PROCEDURE — 120N000001 HC R&B MED SURG/OB

## 2021-12-24 PROCEDURE — 85384 FIBRINOGEN ACTIVITY: CPT | Performed by: INTERNAL MEDICINE

## 2021-12-24 RX ADMIN — ENOXAPARIN SODIUM 40 MG: 40 INJECTION SUBCUTANEOUS at 08:53

## 2021-12-24 RX ADMIN — ALPRAZOLAM 0.25 MG: 0.25 TABLET ORAL at 12:37

## 2021-12-24 RX ADMIN — ALBUTEROL SULFATE 2 PUFF: 90 AEROSOL, METERED RESPIRATORY (INHALATION) at 09:00

## 2021-12-24 RX ADMIN — SODIUM CHLORIDE 50 ML: 9 INJECTION, SOLUTION INTRAVENOUS at 16:59

## 2021-12-24 RX ADMIN — INSULIN ASPART 2 UNITS: 100 INJECTION, SOLUTION INTRAVENOUS; SUBCUTANEOUS at 12:11

## 2021-12-24 RX ADMIN — INSULIN ASPART 3 UNITS: 100 INJECTION, SOLUTION INTRAVENOUS; SUBCUTANEOUS at 08:53

## 2021-12-24 RX ADMIN — ALBUTEROL SULFATE 2 PUFF: 90 AEROSOL, METERED RESPIRATORY (INHALATION) at 21:12

## 2021-12-24 RX ADMIN — ALBUTEROL SULFATE 2 PUFF: 90 AEROSOL, METERED RESPIRATORY (INHALATION) at 14:26

## 2021-12-24 RX ADMIN — ENOXAPARIN SODIUM 40 MG: 40 INJECTION SUBCUTANEOUS at 20:04

## 2021-12-24 RX ADMIN — ALBUTEROL SULFATE 2 PUFF: 90 AEROSOL, METERED RESPIRATORY (INHALATION) at 05:35

## 2021-12-24 RX ADMIN — ALPRAZOLAM 0.25 MG: 0.25 TABLET ORAL at 20:04

## 2021-12-24 RX ADMIN — Medication 100 MG: at 16:58

## 2021-12-24 RX ADMIN — ALBUTEROL SULFATE 2 PUFF: 90 AEROSOL, METERED RESPIRATORY (INHALATION) at 17:48

## 2021-12-24 RX ADMIN — DEXAMETHASONE SODIUM PHOSPHATE 6 MG: 10 INJECTION INTRAMUSCULAR; INTRAVENOUS at 20:04

## 2021-12-24 ASSESSMENT — ACTIVITIES OF DAILY LIVING (ADL)
ADLS_ACUITY_SCORE: 6

## 2021-12-24 NOTE — PLAN OF CARE
"S-(situation): End of shift note    B-(background): Covid    A-(assessment): Vital signs stable.  /63 (BP Location: Right arm)   Pulse 74   Temp 96.8  F (36  C) (Oral)   Resp 20   Ht 1.753 m (5' 9\")   Wt 96.3 kg (212 lb 4.8 oz)   SpO2 90%   BMI 31.35 kg/m  . Afebrile. Lung sounds CTA B/L.  IS in use. On HFNC 50% FIO2 45 LPM.  A&O. Pt denies pain.  Ambulating independently with SBA, bedrest to conserve O2.  Voiding adequately with urinal at bedside. IV site asymptomatic.  Able to make needs known and uses call light appropriately.     R-(recommendations): Continue to monitor per care plan.    "

## 2021-12-24 NOTE — PLAN OF CARE
Pt is A &O x 4, VSS, afebrile, remains on HFNC at 50% and 45 L with sats in low to mid 90s. Denies feeling short of breathe at rest. Lung sounds CTA. Bowels normal active, x 1 BM today. Using urinal independently at the bedside. Up to brush teeth this afternoon and 02 sats remained in low 90s and denied any shortness of breathe at this time. Fair appetite. Prn xanax given x 1 per pt request. Uses call light appropriately. Will continue to monitor vitals, pain, respiratory status, and wean 02 as able.

## 2021-12-24 NOTE — PROGRESS NOTES
[facility]    Medicine Progress Note - Hospitalist Service       Date of Admission:  12/22/2021    Assessment & Plan                       Jaquan Schwab is a 46 year old male admitted on 12/22/2021. He has been admitted with  symptoms including sob, cough,  myalgia, nausea, and fever - that started on Dec 14th.  Negative at home COVID test 12/20. Symptoms have been progressively worsening and patient came to the ER yesterday, tested positive for COVID and received remdesivir 1 dose and dexamethasone 1 dose yesterday and was discharged on oxygen 3 liters to home. He was found to have acute hypoxic respiratory failure and COVID pneumonia. Manage with  iv remdesivir and dexamethasone. Will monitor respiratory status closely         12/24 :       Respiratory status stable on HFNC  on HFNC 45 liters 50%  Continue on dexamethasone, remdesivir  S/p  1 dose of tociluzumab       Blood sugars  elevated   On sliding scale insulin - increased to high resistant, lantus dose increased to 25 units at bedtime.    Not medically ready for discharge at this time.  On HFNC              A/p :              Acute hypoxic respiratory failure : 2/2 COVID pneumonia, on HFNC       COVID pneumonia : manage with dexamethasone - started 12/21, remdesivir 12/21, s/p 1 dose of actemra.       HLD : on statin       DM2 : on jardiance 10 mg daily, metformin 2 gm daily - at home. On sliding scale insulin + lantus.       GERD : on PPI, pepcid       Neuropathy : on gabapentin 300 mg in am and 600 mg at bedtime             Diet: Moderate Consistent Carb (60 g CHO per Meal) Diet  Room Service    DVT Prophylaxis: Enoxaparin (Lovenox) SQ  [place] Catheter: Not present  Central Lines: None  Code Status: Full Code      Disposition Plan   Expected Discharge: 12/27/2021     Anticipated discharge location:  Awaiting care coordination huddle  Delays:     Covid Test Positive  Oxygen Needs            The patient's care was discussed  with the Bedside Nurse and Patient.    Heladio Eddy MD  Hospitalist Service  [facility]  Securely message with the [employer] Web Console (learn more here)  Text page via [employer] Paging/Directory        Clinically Significant Risk Factors Present on Admission             # Diabetes, type II: last A1C 7.9 % (Ref range: 0.0 - 5.6 %)  # Obesity: last Body mass index is 31.35 kg/m .      ______________________________________________________________________        Data reviewed today: I reviewed all medications, new labs and imaging results over the last 24 hours. I personally reviewed no images or EKG's today.    Physical Exam   Vital Signs: Temp: 97  F (36.1  C) Temp src: Axillary BP: 113/85 Pulse: 75   Resp: 21 SpO2: 94 % O2 Device: High Flow Nasal Cannula (HFNC) Oxygen Delivery: 45 LPM  Weight: 212 lbs 4.8 oz       GENERAL: The patient is not in any acute distressed. Awake and alert.  HEENT: Nonicteric sclerae, PERRLA, EOMI. Oropharynx clear. Moist mucous membranes. Conjunctivae appear well perfused.  HEART: Regular rate and rhythm without murmurs.  LUNGS: Clear to auscultation bilaterally. No wheezing or crackles.  ABDOMEN: Soft, positive bowel sounds, nontender.  SKIN: No rash, no excessive bruising, petechiae, or purpura.  EXTREMITIES : no rashes, no swelling in legs.  NEUROLOGIC: conscious and oriented, follows commands, no obvious focal deficits.  ROS: All other systems negative       Data   Recent Labs   Lab 12/24/21  1208 12/24/21  0751 12/24/21  0541 12/23/21  0741 12/23/21  0538 12/22/21  1846 12/22/21  1432 12/21/21  0623   WBC  --   --  3.8*  --  3.2*  --  4.2 1.9*   HGB  --   --  15.5  --  14.8  --  15.7 16.2   MCV  --   --  85  --  86  --  84 84   PLT  --   --  167  --  144*  --  135* 108*   NA  --   --  138  --  138  --  133 133   POTASSIUM  --   --  4.4  --  4.5  --  4.4 4.2   CHLORIDE  --   --  109  --  108  --  103 104   CO2  --   --  22  --  24  --  22 20   BUN  --   --   19  --  17  --  17 11   CR  --   --  0.43*  --  0.58*  --  0.60* 0.58*   ANIONGAP  --   --  7  --  6  --  8 9   JAZMYNE  --   --  9.0  --  8.8  --  8.8 8.6   * 257* 290*   < > 255*   < > 316* 317*   ALBUMIN  --   --   --   --   --   --   --  3.1*   PROTTOTAL  --   --   --   --   --   --   --  7.1   BILITOTAL  --   --   --   --   --   --   --  0.6   ALKPHOS  --   --   --   --   --   --   --  95   ALT  --   --   --   --   --   --   --  33   AST  --   --   --   --   --   --   --  21    < > = values in this interval not displayed.

## 2021-12-24 NOTE — PLAN OF CARE
Major Events: N/a    Vitals: Stable  Oxygen Needs: HFNC   Pain: Denies    Neuro: Alert and oriented x4 Pulmonary: Lung sounds Diminished Cardiovascular: WNL GI/: WNL    Infusion Status: Saline Locked   PRN's given: None  Protocols: None     Plan: Continue to monitor

## 2021-12-25 PROBLEM — E11.9 TYPE 2 DIABETES MELLITUS, WITHOUT LONG-TERM CURRENT USE OF INSULIN (H): Status: ACTIVE | Noted: 2021-12-25

## 2021-12-25 PROBLEM — G62.9 NEUROPATHY: Status: ACTIVE | Noted: 2021-12-25

## 2021-12-25 PROBLEM — E78.2 MIXED HYPERLIPIDEMIA: Status: ACTIVE | Noted: 2021-12-25

## 2021-12-25 PROBLEM — K21.9 GASTROESOPHAGEAL REFLUX DISEASE WITHOUT ESOPHAGITIS: Status: ACTIVE | Noted: 2021-12-25

## 2021-12-25 LAB
ANION GAP SERPL CALCULATED.3IONS-SCNC: 7 MMOL/L (ref 3–14)
BUN SERPL-MCNC: 18 MG/DL (ref 7–30)
CALCIUM SERPL-MCNC: 9.4 MG/DL (ref 8.5–10.1)
CHLORIDE BLD-SCNC: 104 MMOL/L (ref 94–109)
CO2 SERPL-SCNC: 25 MMOL/L (ref 20–32)
CREAT SERPL-MCNC: 0.57 MG/DL (ref 0.66–1.25)
CRP SERPL-MCNC: 15.2 MG/L (ref 0–8)
D DIMER PPP FEU-MCNC: 0.38 UG/ML FEU (ref 0–0.5)
ERYTHROCYTE [DISTWIDTH] IN BLOOD BY AUTOMATED COUNT: 12 % (ref 10–15)
FIBRINOGEN PPP-MCNC: 475 MG/DL (ref 170–490)
GFR SERPL CREATININE-BSD FRML MDRD: >90 ML/MIN/1.73M2
GLUCOSE BLD-MCNC: 311 MG/DL (ref 70–99)
GLUCOSE BLDC GLUCOMTR-MCNC: 232 MG/DL (ref 70–99)
GLUCOSE BLDC GLUCOMTR-MCNC: 248 MG/DL (ref 70–99)
GLUCOSE BLDC GLUCOMTR-MCNC: 255 MG/DL (ref 70–99)
GLUCOSE BLDC GLUCOMTR-MCNC: 273 MG/DL (ref 70–99)
GLUCOSE BLDC GLUCOMTR-MCNC: 383 MG/DL (ref 70–99)
HCT VFR BLD AUTO: 48.1 % (ref 40–53)
HGB BLD-MCNC: 16.2 G/DL (ref 13.3–17.7)
MCH RBC QN AUTO: 28.8 PG (ref 26.5–33)
MCHC RBC AUTO-ENTMCNC: 33.7 G/DL (ref 31.5–36.5)
MCV RBC AUTO: 86 FL (ref 78–100)
PLATELET # BLD AUTO: 184 10E3/UL (ref 150–450)
POTASSIUM BLD-SCNC: 4.7 MMOL/L (ref 3.4–5.3)
RBC # BLD AUTO: 5.62 10E6/UL (ref 4.4–5.9)
SODIUM SERPL-SCNC: 136 MMOL/L (ref 133–144)
WBC # BLD AUTO: 3.3 10E3/UL (ref 4–11)

## 2021-12-25 PROCEDURE — 85027 COMPLETE CBC AUTOMATED: CPT | Performed by: INTERNAL MEDICINE

## 2021-12-25 PROCEDURE — 99233 SBSQ HOSP IP/OBS HIGH 50: CPT | Performed by: NURSE PRACTITIONER

## 2021-12-25 PROCEDURE — 82310 ASSAY OF CALCIUM: CPT | Performed by: INTERNAL MEDICINE

## 2021-12-25 PROCEDURE — 250N000009 HC RX 250: Performed by: NURSE PRACTITIONER

## 2021-12-25 PROCEDURE — 258N000003 HC RX IP 258 OP 636: Performed by: NURSE PRACTITIONER

## 2021-12-25 PROCEDURE — 250N000011 HC RX IP 250 OP 636: Performed by: INTERNAL MEDICINE

## 2021-12-25 PROCEDURE — 86140 C-REACTIVE PROTEIN: CPT | Performed by: INTERNAL MEDICINE

## 2021-12-25 PROCEDURE — 85379 FIBRIN DEGRADATION QUANT: CPT | Performed by: INTERNAL MEDICINE

## 2021-12-25 PROCEDURE — 120N000001 HC R&B MED SURG/OB

## 2021-12-25 PROCEDURE — 250N000013 HC RX MED GY IP 250 OP 250 PS 637: Performed by: INTERNAL MEDICINE

## 2021-12-25 PROCEDURE — 250N000011 HC RX IP 250 OP 636: Performed by: NURSE PRACTITIONER

## 2021-12-25 PROCEDURE — 36415 COLL VENOUS BLD VENIPUNCTURE: CPT | Performed by: INTERNAL MEDICINE

## 2021-12-25 PROCEDURE — 85384 FIBRINOGEN ACTIVITY: CPT | Performed by: INTERNAL MEDICINE

## 2021-12-25 RX ORDER — ALBUTEROL SULFATE 90 UG/1
2 AEROSOL, METERED RESPIRATORY (INHALATION)
Qty: 18 G | Refills: 0 | Status: SHIPPED | OUTPATIENT
Start: 2021-12-25

## 2021-12-25 RX ADMIN — ALBUTEROL SULFATE 2 PUFF: 90 AEROSOL, METERED RESPIRATORY (INHALATION) at 11:01

## 2021-12-25 RX ADMIN — ALPRAZOLAM 0.25 MG: 0.25 TABLET ORAL at 09:10

## 2021-12-25 RX ADMIN — ALBUTEROL SULFATE 2 PUFF: 90 AEROSOL, METERED RESPIRATORY (INHALATION) at 05:58

## 2021-12-25 RX ADMIN — ALBUTEROL SULFATE 2 PUFF: 90 AEROSOL, METERED RESPIRATORY (INHALATION) at 21:41

## 2021-12-25 RX ADMIN — ENOXAPARIN SODIUM 40 MG: 40 INJECTION SUBCUTANEOUS at 21:30

## 2021-12-25 RX ADMIN — REMDESIVIR 100 MG: 100 INJECTION, POWDER, LYOPHILIZED, FOR SOLUTION INTRAVENOUS at 17:31

## 2021-12-25 RX ADMIN — ALBUTEROL SULFATE 2 PUFF: 90 AEROSOL, METERED RESPIRATORY (INHALATION) at 14:26

## 2021-12-25 RX ADMIN — DEXAMETHASONE SODIUM PHOSPHATE 6 MG: 10 INJECTION INTRAMUSCULAR; INTRAVENOUS at 21:30

## 2021-12-25 RX ADMIN — ALBUTEROL SULFATE 2 PUFF: 90 AEROSOL, METERED RESPIRATORY (INHALATION) at 17:34

## 2021-12-25 RX ADMIN — ENOXAPARIN SODIUM 40 MG: 40 INJECTION SUBCUTANEOUS at 08:11

## 2021-12-25 RX ADMIN — ALPRAZOLAM 0.25 MG: 0.25 TABLET ORAL at 19:20

## 2021-12-25 ASSESSMENT — ACTIVITIES OF DAILY LIVING (ADL)
ADLS_ACUITY_SCORE: 6

## 2021-12-25 NOTE — PROGRESS NOTES
[facility] located within [facility]    Medicine Progress Note - Hospitalist Service       Date of Admission:  12/22/2021    Assessment & Plan           Jaquan Schwab is a 46-year-old male who presented to the emergency room with of fever, shortness of breath, body aches, fatigue, and feeling unwell since December 12th.   He was febrile with temp of 103.1  F on arrival. He has a history of obesity, adult-onset diabetes, hyperlipidemia. COVID was confirmed on CT scan which showed no evidence of PE despite his elevated D-dimer.  All his labs were consistent with COVID-19 illness including leukopenia.  He was initially given Decadron and sent home on home oxygen which he could use up to 3 L.  Despite 3 L by nasal cannula at home his O2 sats were in the high 80s and he returned to the emergency department because of increasing shortness of breath and progressive severe weakness.  He is unvaccinated.  He is a non-smoker. Patient was admitted and initiated on Remdesivir and Decadron. He has been on 5 L overnight but has weaned to 2 L today.     # Confirmed COVID-19 infection    # Acute Hypoxic Respiratory Failure secondary to COVID-19 infection  # Viral Pneumonia secondary to COVID-19 infection     Symptom Onset 12/10   Date of 1st Positive Test 12/21   Vaccination Status Declines Vaccine       - Admit to medical floor with continuous pulse ox, COVID-19 special precautions  - Oxygen: continue current support with nasal cannula at 2 L/min; titrate to keep SpO2 between 90-96%  - Labs: daily COVID labs ordered (CBC, creatinine, retic count, LDH, INR/PT, D-dimer, fibrinogen, troponin, CRP)   - Imaging: no additional imaging needed at this time  - Breathing treatments: albuterol inhaler four times a day scheduled and PRN; avoid nebulizers in favor of MDIs   - IV fluids: not indicated at this time  - Antibiotics: not indicated   - COVID-Focused Medications: Dexamethasone 6 mg x 10 days or until hospital discharge,  started on 12/21 and Remdesivir x 5 days or until hospital discharge, started on 12/21  - DVT Prophylaxis: at high risk of thrombotic complications due to COVID-19 (DDimer = 0.38 ug/mL FEU (Ref range: 0.00 - 0.50 ug/mL FEU) ).          - PROPHYLACTIC dosing: lovenox 40mg daily        - consider anticoag on discharge for 30 days & until return to normal mobility      Gastroesophageal reflux disease without esophagitis  Assessment: Chronic and stable.   Plan: Continue home medications.     Type 2 diabetes mellitus, without long-term current use of insulin (H)  Assessment: Chronic and stable. On Jardiance and Metformin. Now on sliding scale insulin and Lantus with his decadron dose. Glucose elevated 200-300.   Plan: Sliding scale insulin and lantus NEW for patient, will need education on insulin and wean down after decadron  Hypoglycemia protocol ordered  Glucose QID     Neuropathy  Assessment: Chronic and stable  Plan: Resume Gabapentin dose 300 mg in AM and 600 mg inPM    Mixed hyperlipidemia  Assessment: Noted, chronic stable  Plan: Resume home meds on discharge.        Diet: Moderate Consistent Carb (60 g CHO per Meal) Diet  Room Service  Diet    DVT Prophylaxis: Enoxaparin (Lovenox) SQ  [place] Catheter: Not present  Central Lines: None  Code Status: Full Code      Disposition Plan   Expected Discharge: 12/27/2021     Anticipated discharge location:  Awaiting care coordination huddle  Delays:     Covid Test Positive  Oxygen Needs            The patient's care was discussed with the Attending Physician, Dr. Hermosillo, Bedside Nurse and Patient.    Sade Rios [facility]  Hospitalist Service  [facility]  Securely message with the [employer] Web Console (learn more here)  Text page via [employer] Paging/Directory        Clinically Significant Risk Factors Present on Admission             # Diabetes, type II: last A1C 7.9 % (Ref range: 0.0 - 5.6 %)  # Obesity: last Body mass index is 31.35 kg/m .       ______________________________________________________________________    Interval History   Patient seen while sitting up in bed. Alert, oriented, appropriate. Ongoing SOB, on Oxygen, down to 3 L this AM and 2 L this PM.  Hemodynamically stable and Afebrile. Patient denies pain. Patient is up with SBA.   Neurologically intact, conversant. No skin changes. Appetite is fair. Patient states sleeping well. Tolerating medications without significant side effects.  No new concerns today.      No hemoptysis. No palpitations, tachycardia, irregular heart beat, chest pain, dyspnea on exertion, orthopnea, lower extremity edema.  Patient with no dysphagia, nausea, vomiting, heartburn, abdominal pain, excessive gas or bloating, constipation or diarrhea. No nocturia, dysuria, frequency, urgency, decreased urinary stream and incontinence. No joint pain, joint swelling and joint stiffness.  No headaches, syncope, local weakness. No noted anxiety, depression and agitation.       Data reviewed today: I reviewed all medications, new labs and imaging results over the last 24 hours.     Physical Exam   Vital Signs: Temp: 97.7  F (36.5  C) Temp src: Oral BP: 123/72 Pulse: 76   Resp: 18 SpO2: 93 % O2 Device: Nasal cannula Oxygen Delivery: 2 LPM  Weight: 212 lbs 4.8 oz  GENERAL APPEARANCE: Alert, no distress at rest  EYES: Pupils are equal, reactive to light  RESP: normal respiratory effort; clear breath sounds bilaterally; no significant adventitious breath sounds  CV: regular rhythm   ABD: soft,  no tenderness; no rebound or guarding; bowel sounds are normal  MS: no gross deformities noted; normal muscle tone.  EXT: No calf tenderness or pitting edema; no clubbing or cyanosis  SKIN: no rash, warm skin, intact  NEURO: no facial droop; no focal deficits, speech is normal  PSYCH: normal mood and affect      Data   Recent Labs   Lab 12/25/21  1206 12/25/21  0734 12/25/21  0536 12/24/21  0751 12/24/21  0541 12/23/21  0741  12/23/21  0538 12/22/21  1432 12/21/21  0623   WBC  --   --  3.3*  --  3.8*  --  3.2*   < > 1.9*   HGB  --   --  16.2  --  15.5  --  14.8   < > 16.2   MCV  --   --  86  --  85  --  86   < > 84   PLT  --   --  184  --  167  --  144*   < > 108*   NA  --   --  136  --  138  --  138   < > 133   POTASSIUM  --   --  4.7  --  4.4  --  4.5   < > 4.2   CHLORIDE  --   --  104  --  109  --  108   < > 104   CO2  --   --  25  --  22  --  24   < > 20   BUN  --   --  18  --  19  --  17   < > 11   CR  --   --  0.57*  --  0.43*  --  0.58*   < > 0.58*   ANIONGAP  --   --  7  --  7  --  6   < > 9   JAZMYNE  --   --  9.4  --  9.0  --  8.8   < > 8.6   * 248* 311*   < > 290*   < > 255*   < > 317*   ALBUMIN  --   --   --   --   --   --   --   --  3.1*   PROTTOTAL  --   --   --   --   --   --   --   --  7.1   BILITOTAL  --   --   --   --   --   --   --   --  0.6   ALKPHOS  --   --   --   --   --   --   --   --  95   ALT  --   --   --   --   --   --   --   --  33   AST  --   --   --   --   --   --   --   --  21    < > = values in this interval not displayed.

## 2021-12-25 NOTE — PROVIDER NOTIFICATION
Oxygen Documentation:   I certify that this patient, Jaquan Schwab has been under my care (or a nurse practitioner or physican's assistant working with me). This is the face-to-face encounter for oxygen medical necessity.      Jaquan Schwab is now in a chronic stable state and continues to require supplemental oxygen. Patient has continued oxygen desaturation due to COVID-19.    Alternative treatment(s) tried or considered and deemed clinically infective for treatment of COVID-19 include inhalers.  If portability is ordered, is the patient mobile within the home? yes    **Patients who qualify for home O2 coverage under the CMS guidelines require ABG tests or O2 sat readings obtained closest to, but no earlier than 2 days prior to the discharge, as evidence of the need for home oxygen therapy. Testing must be performed while patient is in the chronic stable state. See notes for O2 sats.**      Sade Rios, CNP  Dr Yumiko Hermosillo

## 2021-12-25 NOTE — PLAN OF CARE
Pt was on HFNC 50% FIO2 and 45LPM at beginning of shift. Pt asking for HFNC to be taken off and for o2 to be given per oxymask as had prior to needing HFNC. HFNC weaned to 40% FIO2 and 40LPM, O2 sats 92-95% for over 30 minutes. Writer spoke with RT and suggested to transition to oxymizer cannula at 5LPM. Oxymizer cannula applied at 5LPM. O2 sats 93-95%. Lungs are clear. Pt has slept on L and R side during night. Denies pain. Using incentive spirometer to 1000. Will continue to monitor respiratory status, wean O2 as able.

## 2021-12-25 NOTE — PLAN OF CARE
Problem: Gas Exchange Impaired (Pulmonary Impairment)  Goal: Optimal Gas Exchange  Outcome: No Change   Patient A&Ox4. VSS. Denies any pain or SOA. Up moving in room, showered with no increased work of breathing. O2 weaned from HFNC to RA this evening. Plan to discharge with Lantus and sliding scale insulin. Patient reports when first diagnosed with diabetes was on insulin. Not currently. Patient believes he will be capable to administration at home. Plan for patient to self-administer insulin tonight with nurse instructions. Anticipate discharge to home, already has home O2 if needed.  Tr Tee RN

## 2021-12-26 VITALS
OXYGEN SATURATION: 91 % | BODY MASS INDEX: 31.44 KG/M2 | HEART RATE: 67 BPM | RESPIRATION RATE: 18 BRPM | SYSTOLIC BLOOD PRESSURE: 105 MMHG | WEIGHT: 212.3 LBS | DIASTOLIC BLOOD PRESSURE: 59 MMHG | HEIGHT: 69 IN | TEMPERATURE: 96.9 F

## 2021-12-26 LAB
ANION GAP SERPL CALCULATED.3IONS-SCNC: 6 MMOL/L (ref 3–14)
BACTERIA BLD CULT: NO GROWTH
BACTERIA BLD CULT: NO GROWTH
BUN SERPL-MCNC: 18 MG/DL (ref 7–30)
CALCIUM SERPL-MCNC: 8.9 MG/DL (ref 8.5–10.1)
CHLORIDE BLD-SCNC: 105 MMOL/L (ref 94–109)
CO2 SERPL-SCNC: 24 MMOL/L (ref 20–32)
CREAT SERPL-MCNC: 0.46 MG/DL (ref 0.66–1.25)
CRP SERPL-MCNC: 8.9 MG/L (ref 0–8)
D DIMER PPP FEU-MCNC: 0.32 UG/ML FEU (ref 0–0.5)
ERYTHROCYTE [DISTWIDTH] IN BLOOD BY AUTOMATED COUNT: 11.9 % (ref 10–15)
FIBRINOGEN PPP-MCNC: 418 MG/DL (ref 170–490)
GFR SERPL CREATININE-BSD FRML MDRD: >90 ML/MIN/1.73M2
GLUCOSE BLD-MCNC: 282 MG/DL (ref 70–99)
GLUCOSE BLDC GLUCOMTR-MCNC: 264 MG/DL (ref 70–99)
GLUCOSE BLDC GLUCOMTR-MCNC: 378 MG/DL (ref 70–99)
HCT VFR BLD AUTO: 46.2 % (ref 40–53)
HGB BLD-MCNC: 16.2 G/DL (ref 13.3–17.7)
MCH RBC QN AUTO: 29.3 PG (ref 26.5–33)
MCHC RBC AUTO-ENTMCNC: 35.1 G/DL (ref 31.5–36.5)
MCV RBC AUTO: 84 FL (ref 78–100)
PLATELET # BLD AUTO: 191 10E3/UL (ref 150–450)
POTASSIUM BLD-SCNC: 4.4 MMOL/L (ref 3.4–5.3)
RBC # BLD AUTO: 5.53 10E6/UL (ref 4.4–5.9)
SODIUM SERPL-SCNC: 135 MMOL/L (ref 133–144)
WBC # BLD AUTO: 4.4 10E3/UL (ref 4–11)

## 2021-12-26 PROCEDURE — 85379 FIBRIN DEGRADATION QUANT: CPT | Performed by: INTERNAL MEDICINE

## 2021-12-26 PROCEDURE — 99239 HOSP IP/OBS DSCHRG MGMT >30: CPT | Performed by: NURSE PRACTITIONER

## 2021-12-26 PROCEDURE — 80048 BASIC METABOLIC PNL TOTAL CA: CPT | Performed by: NURSE PRACTITIONER

## 2021-12-26 PROCEDURE — 85384 FIBRINOGEN ACTIVITY: CPT | Performed by: INTERNAL MEDICINE

## 2021-12-26 PROCEDURE — 85027 COMPLETE CBC AUTOMATED: CPT | Performed by: INTERNAL MEDICINE

## 2021-12-26 PROCEDURE — 250N000011 HC RX IP 250 OP 636: Performed by: INTERNAL MEDICINE

## 2021-12-26 PROCEDURE — 36415 COLL VENOUS BLD VENIPUNCTURE: CPT | Performed by: INTERNAL MEDICINE

## 2021-12-26 PROCEDURE — 86140 C-REACTIVE PROTEIN: CPT | Performed by: INTERNAL MEDICINE

## 2021-12-26 RX ADMIN — ALBUTEROL SULFATE 2 PUFF: 90 AEROSOL, METERED RESPIRATORY (INHALATION) at 05:42

## 2021-12-26 RX ADMIN — ENOXAPARIN SODIUM 40 MG: 40 INJECTION SUBCUTANEOUS at 09:06

## 2021-12-26 RX ADMIN — ALBUTEROL SULFATE 2 PUFF: 90 AEROSOL, METERED RESPIRATORY (INHALATION) at 09:07

## 2021-12-26 ASSESSMENT — ACTIVITIES OF DAILY LIVING (ADL)
ADLS_ACUITY_SCORE: 6

## 2021-12-26 NOTE — PLAN OF CARE
Vss. Afebrile. Pt required nc O2 at bedtime due to RA O2 sats fluctuating between 86-89% intermittently for 30 minutes when at rest, asleep. O2 at 1L nc t/o night with O2 sats low to mid 90s. Lungs are clear. Pt hopes to be discharged to home today. Has O2 at home.  last evening and received sliding scale insulin and lantus insulin.  overnight. Pt given teaching on self administration of insulin and gave own insulin last evening, verbalized understanding and felt confident with home insulin administration. Will continue with plan of care, monitor respiratory status, labs, vs.

## 2021-12-26 NOTE — DISCHARGE SUMMARY
Former[facility]  Hospitalist Discharge Summary      Date of Admission:  12/22/2021  Date of Discharge:  12/26/2021  Discharging Provider: Sade Rios CNP      Discharge Diagnoses   Active Problems:    Acute respiratory failure with hypoxia (H)    Pneumonia due to 2019 novel coronavirus    Gastroesophageal reflux disease without esophagitis    Type 2 diabetes mellitus, without long-term current use of insulin (H)    Neuropathy    Mixed hyperlipidemia      Follow-ups Needed After Discharge   Follow-up Appointments     Follow-up and recommended labs and tests       Follow up with primary care provider, Nathalie Lott, RIRI FOR COVID,   within 7 days for hospital follow- up.  No follow up labs or test are   needed.             Unresulted Labs Ordered in the Past 30 Days of this Admission     Date and Time Order Name Status Description    12/21/2021  6:20 AM Blood Culture Peripheral Blood Preliminary     12/21/2021  6:20 AM Blood Culture Line, venous Preliminary       These results will be followed up by PCP    Discharge Disposition   Discharged to home  Condition at discharge: Stable      Hospital Course            # Confirmed COVID-19 infection    # Acute Hypoxic Respiratory Failure secondary to COVID-19 infection  # Viral Pneumonia secondary to COVID-19 infection     Jaquan Schwab is a 46-year-old male who presented to the emergency room with of fever, shortness of breath, body aches, fatigue, and feeling unwell since December 12th.   He was febrile with temp of 103.1  F on arrival. He has a history of obesity, adult-onset diabetes, hyperlipidemia. COVID was confirmed on CT scan which showed no evidence of PE despite his elevated D-dimer.  All his labs were consistent with COVID-19 illness including leukopenia.  He was initially given Decadron and sent home on home oxygen which he could use up to 3 L.  Despite 3 L by nasal cannula at home his O2 sats were in the high 80s and he  returned to the emergency department because of increasing shortness of breath and progressive severe weakness.  He is unvaccinated.  He is a non-smoker. Patient was admitted and initiated on Remdesivir and Decadron. He required HFNC on admission, 12/22 80 % and 50L.  He has been improving consistently during hospitalization, he was on 1 Liter oxygen overnight after weaning to 2 L yesterday. He has home oxygen already. Patient will continue his Decadron at home. He will discharge on Xarelto for 30 days. Will continue to use the albuterol inhaler four times a day scheduled and PRN. Discharged in stable condition. Patient will need to be in isolation for 21 days, until January 11th, 2022.     Gastroesophageal reflux disease without esophagitis  Assessment: Chronic and stable.   Plan: Continue home medications.     Type 2 diabetes mellitus, without long-term current use of insulin (H)  Assessment: Chronic and stable. On Jardiance and Metformin. Now on sliding scale insulin and Lantus with his decadron dose. Glucose elevated 200-300.   Plan:   - Sliding scale insulin and lantus NEW for patient although he did have the insulin when he was first diagnosed.   - Education on insulin and wean down after decadron is stopped    Neuropathy  Assessment: Chronic and stable  Plan: Resume Gabapentin dose 300 mg in AM and 600 mg inPM    Mixed hyperlipidemia  Assessment: Noted, chronic stable  Plan: Resume home meds on discharge.       Consultations This Hospital Stay   None    Code Status   Full Code    Time Spent on this Encounter   I, Sade Rios CNP, personally saw the patient today and spent greater than 30 minutes discharging this patient.       Sade Rios CNP  [address] SURGICAL  [address] [place] [address]  Phone: [phone]  ______________________________________________________________________    Physical Exam   Vital Signs: Temp: 96.9  F (36.1  C) Temp src:  Oral BP: 105/59 Pulse: 67   Resp: 18 SpO2: 91 % O2 Device: Nasal cannula Oxygen Delivery: 1 LPM  Weight: 212 lbs 4.8 oz  GENERAL APPEARANCE: Alert, no distress at rest  EYES: Pupils are equal, reactive to light  RESP: normal respiratory effort; clear breath sounds bilaterally; no significant adventitious breath sounds  CV: regular rhythm   ABD: soft,  no tenderness; no rebound or guarding; bowel sounds are normal  MS: no gross deformities noted; normal muscle tone.  EXT: No calf tenderness or pitting edema; no clubbing or cyanosis  SKIN: no rash, warm skin, intact  NEURO: no facial droop; no focal deficits, speech is normal  PSYCH: normal mood and affect       Primary Care Physician   Nathalie Lott    Discharge Orders      [facility] Referral      Reason for your hospital stay    You were in the hospital for COVID pneumonia.     Follow-up and recommended labs and tests     Follow up with primary care provider, Nathalie Lott, RIRI FOR COVID, within 7 days for hospital follow- up.  No follow up labs or test are needed.     Activity    Your activity upon discharge: activity as tolerated     Discharge - Quarantine/Isolation Instruction    Date of symptom onset:     Date of first positive test:    Stay home and away from others (self-isolate) for at least 20 days from when your symptoms started And...   You've had no fevers and no medicine that reduces fever for 1 full day (24 hours)  And...   Your other symptoms have resolved (gotten better).     Monitor and record    blood glucose 4 times a day, before meals and at bedtime  Use sliding scale insulin until blood sugars decrease to normal range  OK to resume home medications  STOP LANTUS THE NIGHT BEFORE YOUR LAST DOSE OF STEROIDS  You will need several days of sliding scale insulin after you are done with the Decadron     Oxygen Adult/Peds    Oxygen Documentation:   I certify that this patient, Jaquan Schwab has been under my care (or a nurse practitioner or  physican's assistant working with me). This is the face-to-face encounter for oxygen medical necessity.      Jaquan Schwab is now in a chronic stable state and continues to require supplemental oxygen. Patient has continued oxygen desaturation due to COVID-19.    Alternative treatment(s) tried or considered and deemed clinically infective for treatment of COVID-19 include inhalers.  If portability is ordered, is the patient mobile within the home? yes    **Patients who qualify for home O2 coverage under the CMS guidelines require ABG tests or O2 sat readings obtained closest to, but no earlier than 2 days prior to the discharge, as evidence of the need for home oxygen therapy. Testing must be performed while patient is in the chronic stable state. See notes for O2 sats.**     Diet    Follow this diet upon discharge: Orders Placed This Encounter      Room Service      Moderate Consistent Carb (60 g CHO per Meal) Diet       Significant Results and Procedures   Results for orders placed or performed during the hospital encounter of 12/22/21   XR Chest Port 1 View    Narrative    CHEST ONE VIEW PORTABLE  12/23/2021 8:31 AM     HISTORY: Shortness of breath.    COMPARISON: CT chest dated 12/21/2021.    FINDINGS:  The lungs are markedly hypoaerated. Cardiac silhouette is  prominent which is likely due to the hypoaeration and AP technique. No  pneumothorax or definite pleural fluid collection is seen. Tiny  pleural fluid collections are not excluded. No acute fracture.  Slightly increased pulmonary markings are noted and could be secondary  to COVID-19 pneumonitis, especially given the patchy infiltrates seen  on the prior CT examination dated 12/21/2021.       Impression    IMPRESSION:   1. Hypoaeration.  2. Mildly prominent pulmonary markings could represent subtle  pulmonary infiltrates.  3. Tiny pleural fluid collections are not excluded.  4. No other evidence of acute cardiopulmonary disease is seen.    FELISHA AMAYA,  MD         SYSTEM ID:  [id number]       Discharge Medications   Current Discharge Medication List      START taking these medications    Details   albuterol (PROAIR HFA/PROVENTIL HFA/VENTOLIN HFA) 108 (90 Base) MCG/ACT inhaler Inhale 2 puffs into the lungs every 4 hours (while awake)  Qty: 18 g, Refills: 0    Comments: Pharmacy may dispense brand covered by insurance (Proair, or proventil or ventolin or generic albuterol inhaler)  Associated Diagnoses: Pneumonia due to 2019 novel coronavirus      insulin aspart (NOVOLOG PEN) 100 UNIT/ML pen Inject 1-10 Units Subcutaneous 3 times daily (before meals) Correction Scale - HIGH INSULIN RESISTANCE DOSING     Do Not give Correction Insulin if Pre-Meal BG less than 140.   For Pre-Meal  - 164 give 1 unit.   For Pre-Meal  - 189 give 2 units.   For Pre-Meal  - 214 give 3 units.   For Pre-Meal  - 239 give 4 units.   For Pre-Meal  - 264 give 5 units.   For Pre-Meal  - 289 give 6 units.   For Pre-Meal  - 314 give 7 units.   For Pre-Meal  - 339 give 8 units.   For Pre-Meal  - 364 give 9 units.   For Pre-Meal BG greater than or equal to 365 give 10 units  To be given with prandial insulin, and based on pre-meal blood glucose.   Notify provider if glucose greater than or equal to 350 mg/dL after administration of correction dose. If given at mealtime, administer within 30 minutes of start of meal.  Qty: 15 mL, Refills: 1    Associated Diagnoses: Type 2 diabetes mellitus without complication, without long-term current use of insulin (H)      insulin glargine (LANTUS PEN) 100 UNIT/ML pen Inject 25 Units Subcutaneous At Bedtime  Qty: 15 mL, Refills: 1    Comments: If Lantus is not covered by insurance, may substitute Basaglar at same dose and frequency.    Associated Diagnoses: Type 2 diabetes mellitus without complication, without long-term current use of insulin (H)      rivaroxaban ANTICOAGULANT (XARELTO ANTICOAGULANT) 10 MG TABS  tablet Take 1 tablet (10 mg) by mouth daily (with dinner)  Qty: 30 tablet, Refills: 0    Associated Diagnoses: Pneumonia due to 2019 novel coronavirus         CONTINUE these medications which have NOT CHANGED    Details   atorvastatin (LIPITOR) 40 MG tablet Take 1 tablet by mouth At Bedtime       blood glucose (ACCU-CHEK GUIDE) test strip 1 strip by In Vitro route 3 times daily      cetirizine (ZYRTEC) 10 MG tablet Take 10 mg by mouth daily as needed      dexamethasone (DECADRON) 6 MG tablet Take 1 tablet (6 mg) by mouth daily for 5 days  Qty: 5 tablet, Refills: 0      empagliflozin (JARDIANCE) 10 MG TABS tablet Take 10 mg by mouth daily      famotidine (PEPCID) 20 MG tablet Take 20 mg by mouth 2 times daily      gabapentin (NEURONTIN) 300 MG capsule TAKE ONE CAPSULE BY MOUTH EVERY MORNING AND TAKE TWO CAPSULES BY MOUTH AT BEDTIME      ibuprofen (ADVIL/MOTRIN) 200 MG tablet Take 400-800 mg by mouth every 8 hours as needed      metFORMIN (GLUCOPHAGE-XR) 500 MG 24 hr tablet Take 2,000 mg by mouth daily (with breakfast)      omeprazole (PRILOSEC) 20 MG DR capsule Take 20 mg by mouth daily      tiZANidine (ZANAFLEX) 4 MG tablet Take 4 mg by mouth At Bedtime           Allergies   No Known Allergies

## 2021-12-26 NOTE — PROGRESS NOTES
Care Management Discharge Note    Discharge Date: 12/27/2021     Discharge Disposition: Home    Discharge Services:  [facility]     Discharge DME: Oxygen    Discharge Transportation: family or friend will provide    Private pay costs discussed: Not applicable    PAS Confirmation Code:    Patient/family educated on Medicare website which has current facility and service quality ratings:      Education Provided on the Discharge Plan:      Persons Notified of Discharge Plans: Patient    Patient/Family in Agreement with the Plan: yes    Handoff Referral Completed: Yes    Additional Information:  Patient discharging home with follow up from [facility] COVID 19 Home Monitoring Program.    PRISCA Jennings  [facility] [phone]/ [place] [phone]  Care Management

## 2021-12-26 NOTE — PLAN OF CARE
S-(situation): Patient discharged to ED entrance via W/C with nursing assistant    B-(background): Covid-19    A-(assessment): Pt is A & O x 4, VSS, afebrile, remains on 1 L via nasal cannula with sats in mid 90's. Denies pain, denies shortness of breathe. Lung sounds CTA, has dry/non-productive cough. Bowels normal active, voiding well. Blood sugar of 264 this AM, coverage given and pt self-administered for educational purposes. Up independently.    R-(recommendations): Discharge instructions reviewed with patient. Listed belongings gathered and returned to patient.          Discharge Nursing Criteria:     Care Plan and Patient education resolved: Yes    New Medications- pt has been educated about purpose and side effects: Yes    Vaccines  Influenza status verified at discharge:  Yes    MISC  Prescriptions if needed, hard copies sent with patient  Yes  Home medications returned to patient: NA  Medication Bin checked and emptied on discharge Yes  Patient reports post-discharge pain management plan is effective: Yes    TKA/MIGUEL ÁNGEL ONLY:   Discharged with NORM Stockings No  NORM stocking Education Completed No

## 2021-12-26 NOTE — DISCHARGE INSTRUCTIONS
Rivaroxaban (By mouth)  Rivaroxaban ([id number]-a-[id number])    Treats and prevents blood clots, which lowers the risk of stroke, deep vein thrombosis (DVT), pulmonary embolism (PE), heart attack, and similar conditions. Also prevent blood clots in people who are hospitalized for an acute illness. This medicine is a blood thinner.    Brand Name(s):Xarelto,Xarelto Starter Pack  There may be other brand names for this medicine.    When This Medicine Should Not Be Used:  This medicine is not right for everyone. Do not use it if you had an allergic reaction to rivaroxaban, or if you have severe bleeding.    How to Use This Medicine:  Tablet    Your doctor will tell you how much medicine to use. Do not use more than directed. Take this medicine at the same time each day.    2.5 milligram (mg) or 10 mg tablet: Take with or without food.    15 mg or 20 mg tablet: Take with food.    If you cannot swallow the tablets whole, you may crush the tablet and mix it with a small amount of applesauce. Eat some food right after you swallow the mixture.    Tube feeding: You may crush the tablet and mix the medicine in 50 milliliters (mL) of water before giving it via the tube. This must be followed by a feeding.    This medicine should come with a Medication Guide. Ask your pharmacist for a copy if you do not have one.     Missed dose:   o Ask your doctor or pharmacist if you are not sure what to do if you miss a dose.  o Once-daily dose: If you miss a dose or forget to use your medicine, use it as soon as you can on the same day. Do not use extra medicine to make up for a missed dose.  o Twice-daily dose to prevent serious heart or blood vessel problems (2.5 mg tablet): If you miss a dose, skip the missed dose and take your next dose at your usual time.  o Twice-daily dose to treat a blood clot (15 mg tablet): If you miss a dose or forget to use your medicine, use it as soon as you can on the same day. You may take 2 doses at the same time  to make up for the missed dose. This is only for people who take a total of 30 mg per day.    Store the medicine in a closed container at room temperature, away from heat, moisture, and direct light. You may store the crushed tablet with applesauce or water mixture for up to 4 hours.    Drugs and Foods to Avoid:  Ask your doctor or pharmacist before using any other medicine, including over-the-counter medicines, vitamins, and herbal products.    Some medicines can affect how rivaroxaban works. Tell your doctor if you are using any of the following:  o Carbamazepine, conivaptan, erythromycin, indinavir, itraconazole, ketoconazole, lopinavir, phenytoin, rifampin, ritonavir, Noam's wort  o Another blood thinner (including clopidogrel, enoxaparin, heparin, warfarin)  o NSAID pain or arthritis medicine (including aspirin, celecoxib, diclofenac, ibuprofen, naproxen)    Warnings While Using This Medicine:    Tell your doctor if you are pregnant or breastfeeding, or if you have kidney disease, liver disease, bleeding problems, cancer, lung problems, an artificial heart valve, or antiphospholipid syndrome.    This medicine may increase your risk of bleeding. Be careful to avoid injuries that could cause bleeding. Stay away from rough sports or other situations where you could be bruised, cut, or hurt. Brush and floss your teeth gently. Be careful when using sharp objects, including razors and fingernail clippers. Avoid picking your nose. If you need to blow your nose, blow it gently.    This medicine may cause nerve damage if you have a medical procedure done to your back, including anesthesia or a spinal puncture. This is more likely to happen if you have a history of back injury, back surgery, problems with your spine, or procedures or punctures to your back. Tell your doctor if you are also taking another blood thinner, because this also increases the risk.    Do not stop using this medicine suddenly without asking  your doctor. You might have an increased risk for stroke for a short time after you stop using this medicine.    Tell any doctor or dentist who treats you that you are using this medicine.    Your doctor will do lab tests at regular visits to check on the effects of this medicine. Keep all appointments.     Keep all medicine out of the reach of children. Never share your medicine with anyone.     Possible Side Effects While Using This Medicine:  Call your doctor right away if you notice any of these side effects:    Allergic reaction: Itching or hives, swelling in your face or hands, swelling or tingling in your mouth or throat, chest tightness, trouble breathing    Blistering, peeling, red skin rash    Decrease in how much or how often you urinate    Heavy menstrual bleeding or vaginal bleeding    Red or brown urine, bloody or black, tarry stools    Unusual bleeding or bruising, including frequent nosebleeds    Vomiting of blood or material that looks like coffee grounds    If you notice other side effects that you think are caused by this medicine, tell your doctor.  Call your doctor for medical advice about side effects. You may report side effects to FDA at 5-076-FDA-0251      [facility]   Rosmery Alcantar MD  Tuesday December 28th at 1:10 pm - Video Visit - Hospital follow-up  Instructions in [employer] or [employer]  IF YOU NEED TO RESCHEDULE call [phone]

## 2021-12-27 ENCOUNTER — PATIENT OUTREACH (OUTPATIENT)
Dept: CARE COORDINATION | Facility: CLINIC | Age: 46
End: 2021-12-27
Payer: COMMERCIAL

## 2021-12-27 NOTE — PROGRESS NOTES
Clinic Care Coordination Contact  Care Team Conversations    Care Coordination Hospital/ED Discharge Follow up Note    Hospital/ED Discharge date: 12/26/21    Reason/Diagnosis for Hospital/ED visit: hypoxia COVID with pneumonia    Are you feeling better, the same, or worse since your Hospital/ED visit? better    Symptoms:     Cough -  productive unknown    Shortness of breath:  no shortness of breath     Chest pain:  No    Fever: No    Current temperature:      Headache:  No    Sore throat:  No    Nasal congestion:  No    Nausea/vomiting/diarrhea:  No    Body aches/joint pains:  Yes    Fatigue:  Yes    Home treatment measures used and outcome:  no    Pulse Oximeter/Oxygen Questions:    Were you sent home with a pulse oximeter?  Yes    Are you currently utilizing the pulse oximeter?  Yes    Do you understand how to use the home oximeter?  Yes    What are your current oxygen saturation levels?  93%    Oxygen saturation levels in ED/IP:  80%    Were you sent home with home oxygen?  Yes    RN to delete remaining Oxygen questions if N/A    Are you currently utilizing the oxygen?  Yes    What liter flow were you instructed to use?  up to 3L/min    What liter flow are you using to maintain your oxygen saturation:  1L/min only at night    What type of home oxygen system do you have (*ask about portability and ability to manage a portable oxygen delivery)?  portable suitcase size concentrator    Who is your supply company?  [facility]      Medications:    Were you prescribed any new medications?  Yes    If yes, have you picked up these new medications?  Yes    Are the medications helping?  Yes    Do you have any questions about your new medications?  No       Follow Up:    Do you have a follow up appointment scheduled with your PCP or specialist?  Yes    Do you have a plan in place in the event of an emergency?  Yes    If patient is established or planning to establish primary care within [facility]  Coordination was offered. Care Coordination accepted/declined:  Yes    [facility] invitation received?  Yes    [facility] invitation accepted, pending patient activation or declined:  accepted    Bryce Guo RN  [facility] Care Coordination

## 2022-05-01 ENCOUNTER — HEALTH MAINTENANCE LETTER (OUTPATIENT)
Age: 47
End: 2022-05-01

## 2022-08-21 ENCOUNTER — HEALTH MAINTENANCE LETTER (OUTPATIENT)
Age: 47
End: 2022-08-21

## 2022-11-21 ENCOUNTER — HEALTH MAINTENANCE LETTER (OUTPATIENT)
Age: 47
End: 2022-11-21

## 2022-12-25 ENCOUNTER — HEALTH MAINTENANCE LETTER (OUTPATIENT)
Age: 47
End: 2022-12-25

## 2023-04-16 ENCOUNTER — HEALTH MAINTENANCE LETTER (OUTPATIENT)
Age: 48
End: 2023-04-16

## 2023-06-02 ENCOUNTER — HEALTH MAINTENANCE LETTER (OUTPATIENT)
Age: 48
End: 2023-06-02

## 2023-09-17 ENCOUNTER — HEALTH MAINTENANCE LETTER (OUTPATIENT)
Age: 48
End: 2023-09-17

## 2024-02-04 ENCOUNTER — HEALTH MAINTENANCE LETTER (OUTPATIENT)
Age: 49
End: 2024-02-04

## 2024-06-23 ENCOUNTER — HEALTH MAINTENANCE LETTER (OUTPATIENT)
Age: 49
End: 2024-06-23

## 2024-11-09 ENCOUNTER — HEALTH MAINTENANCE LETTER (OUTPATIENT)
Age: 49
End: 2024-11-09